# Patient Record
Sex: MALE | Race: WHITE | Employment: FULL TIME | URBAN - METROPOLITAN AREA
[De-identification: names, ages, dates, MRNs, and addresses within clinical notes are randomized per-mention and may not be internally consistent; named-entity substitution may affect disease eponyms.]

---

## 2017-03-21 ENCOUNTER — APPOINTMENT (OUTPATIENT)
Dept: GENERAL RADIOLOGY | Age: 42
DRG: 143 | End: 2017-03-21
Attending: EMERGENCY MEDICINE
Payer: MEDICAID

## 2017-03-21 ENCOUNTER — APPOINTMENT (OUTPATIENT)
Dept: GENERAL RADIOLOGY | Age: 42
DRG: 143 | End: 2017-03-21
Attending: INTERNAL MEDICINE
Payer: MEDICAID

## 2017-03-21 ENCOUNTER — HOSPITAL ENCOUNTER (INPATIENT)
Age: 42
LOS: 1 days | Discharge: HOME | DRG: 143 | End: 2017-03-25
Attending: EMERGENCY MEDICINE | Admitting: INTERNAL MEDICINE
Payer: MEDICAID

## 2017-03-21 DIAGNOSIS — J93.9 PNEUMOTHORAX, RIGHT: ICD-10-CM

## 2017-03-21 DIAGNOSIS — J93.11 PRIMARY SPONTANEOUS PNEUMOTHORAX: Primary | ICD-10-CM

## 2017-03-21 LAB
ALBUMIN SERPL BCP-MCNC: 4.2 G/DL (ref 3.5–5)
ALBUMIN/GLOB SERPL: 1.2 {RATIO} (ref 1.2–3.5)
ALP SERPL-CCNC: 60 U/L (ref 50–136)
ALT SERPL-CCNC: 26 U/L (ref 12–65)
ANION GAP BLD CALC-SCNC: 6 MMOL/L (ref 7–16)
AST SERPL W P-5'-P-CCNC: 16 U/L (ref 15–37)
BASOPHILS # BLD AUTO: 0 K/UL (ref 0–0.2)
BASOPHILS # BLD: 0 % (ref 0–2)
BILIRUB SERPL-MCNC: 0.2 MG/DL (ref 0.2–1.1)
BUN SERPL-MCNC: 13 MG/DL (ref 6–23)
CALCIUM SERPL-MCNC: 8.7 MG/DL (ref 8.3–10.4)
CHLORIDE SERPL-SCNC: 109 MMOL/L (ref 98–107)
CO2 SERPL-SCNC: 31 MMOL/L (ref 21–32)
CREAT SERPL-MCNC: 0.97 MG/DL (ref 0.8–1.5)
DIFFERENTIAL METHOD BLD: ABNORMAL
EOSINOPHIL # BLD: 0.2 K/UL (ref 0–0.8)
EOSINOPHIL NFR BLD: 2 % (ref 0.5–7.8)
ERYTHROCYTE [DISTWIDTH] IN BLOOD BY AUTOMATED COUNT: 12.8 % (ref 11.9–14.6)
GLOBULIN SER CALC-MCNC: 3.4 G/DL (ref 2.3–3.5)
GLUCOSE SERPL-MCNC: 96 MG/DL (ref 65–100)
HCT VFR BLD AUTO: 40.4 % (ref 41.1–50.3)
HGB BLD-MCNC: 13.7 G/DL (ref 13.6–17.2)
IMM GRANULOCYTES # BLD: 0 K/UL (ref 0–0.5)
IMM GRANULOCYTES NFR BLD AUTO: 0.1 % (ref 0–5)
LYMPHOCYTES # BLD AUTO: 45 % (ref 13–44)
LYMPHOCYTES # BLD: 3 K/UL (ref 0.5–4.6)
MCH RBC QN AUTO: 30 PG (ref 26.1–32.9)
MCHC RBC AUTO-ENTMCNC: 33.9 G/DL (ref 31.4–35)
MCV RBC AUTO: 88.4 FL (ref 79.6–97.8)
MONOCYTES # BLD: 0.7 K/UL (ref 0.1–1.3)
MONOCYTES NFR BLD AUTO: 10 % (ref 4–12)
NEUTS SEG # BLD: 3 K/UL (ref 1.7–8.2)
NEUTS SEG NFR BLD AUTO: 43 % (ref 43–78)
PLATELET # BLD AUTO: 196 K/UL (ref 150–450)
PMV BLD AUTO: 9.7 FL (ref 10.8–14.1)
POTASSIUM SERPL-SCNC: 3.9 MMOL/L (ref 3.5–5.1)
PROT SERPL-MCNC: 7.6 G/DL (ref 6.3–8.2)
RBC # BLD AUTO: 4.57 M/UL (ref 4.23–5.67)
SODIUM SERPL-SCNC: 146 MMOL/L (ref 136–145)
WBC # BLD AUTO: 6.9 K/UL (ref 4.3–11.1)

## 2017-03-21 PROCEDURE — 85025 COMPLETE CBC W/AUTO DIFF WBC: CPT | Performed by: EMERGENCY MEDICINE

## 2017-03-21 PROCEDURE — 71010 XR CHEST PORT: CPT

## 2017-03-21 PROCEDURE — 99284 EMERGENCY DEPT VISIT MOD MDM: CPT | Performed by: EMERGENCY MEDICINE

## 2017-03-21 PROCEDURE — 0W9930Z DRAINAGE OF RIGHT PLEURAL CAVITY WITH DRAINAGE DEVICE, PERCUTANEOUS APPROACH: ICD-10-PCS | Performed by: INTERNAL MEDICINE

## 2017-03-21 PROCEDURE — 74011250637 HC RX REV CODE- 250/637: Performed by: INTERNAL MEDICINE

## 2017-03-21 PROCEDURE — 74011250636 HC RX REV CODE- 250/636: Performed by: INTERNAL MEDICINE

## 2017-03-21 PROCEDURE — 99218 HC RM OBSERVATION: CPT

## 2017-03-21 PROCEDURE — 96372 THER/PROPH/DIAG INJ SC/IM: CPT | Performed by: EMERGENCY MEDICINE

## 2017-03-21 PROCEDURE — 71010 XR CHEST SNGL V: CPT

## 2017-03-21 PROCEDURE — 99222 1ST HOSP IP/OBS MODERATE 55: CPT | Performed by: INTERNAL MEDICINE

## 2017-03-21 PROCEDURE — 76040000019: Performed by: INTERNAL MEDICINE

## 2017-03-21 PROCEDURE — 80053 COMPREHEN METABOLIC PANEL: CPT | Performed by: EMERGENCY MEDICINE

## 2017-03-21 RX ORDER — HYDROCODONE BITARTRATE AND ACETAMINOPHEN 7.5; 325 MG/1; MG/1
1 TABLET ORAL
Qty: 10 TAB | Refills: 0 | Status: SHIPPED | OUTPATIENT
Start: 2017-03-21 | End: 2017-03-25

## 2017-03-21 RX ORDER — IBUPROFEN 400 MG/1
400 TABLET ORAL
Status: COMPLETED | OUTPATIENT
Start: 2017-03-21 | End: 2017-03-21

## 2017-03-21 RX ORDER — HYDROCODONE BITARTRATE AND ACETAMINOPHEN 7.5; 325 MG/1; MG/1
1 TABLET ORAL
Status: DISCONTINUED | OUTPATIENT
Start: 2017-03-21 | End: 2017-03-25 | Stop reason: HOSPADM

## 2017-03-21 RX ORDER — SODIUM CHLORIDE 0.9 % (FLUSH) 0.9 %
5-10 SYRINGE (ML) INJECTION AS NEEDED
Status: DISCONTINUED | OUTPATIENT
Start: 2017-03-21 | End: 2017-03-25 | Stop reason: HOSPADM

## 2017-03-21 RX ORDER — IBUPROFEN 400 MG/1
400 TABLET ORAL
Status: DISCONTINUED | OUTPATIENT
Start: 2017-03-21 | End: 2017-03-25 | Stop reason: HOSPADM

## 2017-03-21 RX ORDER — SODIUM CHLORIDE 0.9 % (FLUSH) 0.9 %
5-10 SYRINGE (ML) INJECTION EVERY 8 HOURS
Status: DISCONTINUED | OUTPATIENT
Start: 2017-03-21 | End: 2017-03-25 | Stop reason: HOSPADM

## 2017-03-21 RX ORDER — ENOXAPARIN SODIUM 100 MG/ML
40 INJECTION SUBCUTANEOUS EVERY 24 HOURS
Status: DISCONTINUED | OUTPATIENT
Start: 2017-03-21 | End: 2017-03-25 | Stop reason: HOSPADM

## 2017-03-21 RX ADMIN — IBUPROFEN 400 MG: 400 TABLET, FILM COATED ORAL at 17:52

## 2017-03-21 RX ADMIN — ENOXAPARIN SODIUM 40 MG: 40 INJECTION SUBCUTANEOUS at 09:43

## 2017-03-21 RX ADMIN — Medication 10 ML: at 21:44

## 2017-03-21 RX ADMIN — Medication 10 ML: at 17:55

## 2017-03-21 RX ADMIN — IBUPROFEN 400 MG: 400 TABLET, FILM COATED ORAL at 23:33

## 2017-03-21 RX ADMIN — HYDROCODONE BITARTRATE AND ACETAMINOPHEN 1 TABLET: 7.5; 325 TABLET ORAL at 19:51

## 2017-03-21 NOTE — DISCHARGE SUMMARY
Discharge Note    Negar Ureña  Admission date:  3/21/2017  Discharge date:  3/25/2017  Admitting Diagnosis:  Pneumothorax, unspecified type [J93.9]    Discharge Diagnoses:   Hospital Problems  Date Reviewed: 3/25/2017          Codes Class Noted POA    * (Principal)Pneumothorax, right ICD-10-CM: J93.9  ICD-9-CM: 512.89  3/21/2017 Yes              Consultants:   None     Studies/Procedures:  CXR  Uresil placement    Condition on Discharge:   Stable    Disposition:    Home     Presenting Illness: Patient is a 37year old  male who presents with PTX. He is otherwise healthy, but noticed some mild chest discomfort with respirations on Sunday. He states that this persisted and slightly worsened. Today, he went to . He was found to have a small R sided PTX and he was sent here. He denies SOB and pain. He feels movement with ambulation or taking a deep breath. He is a former smoker and stopped smoking 4 years ago. He denies any similar symptoms in the past and denies any obvious precipitating event. Hospital course: Patient was admitted for observation. He was placed on oxygen for comfort. Chest xray was rechecked and showed persisting small right apical pneumothorax and patient with ongoing complaints of chest pain. Ure-Bianca was placed for treatment of pneumothorax. Uresil was capped and removed 3/24 and follow up CXR on 3/25 with no evidence of pneumothorax. Patient has improved, remains on RA and is now ready for discharge. Patient will follow up as a transitional care appointment in our office 2 weeks with CXR    Physical Exam:   Constitution:  the patient is well developed and in no acute distress  EENMT:  Sclera clear, pupils equal, oral mucosa moist  Respiratory: clear to auscultation bilaterally, RA  Cardiovascular:  RRR without M,G,R  Gastrointestinal: soft and non-tender; with positive bowel sounds. Musculoskeletal: warm without cyanosis. There is no lower leg edema.   Skin:  no jaundice or rashes, no wounds   Neurologic: no gross neuro deficits     Psychiatric:  alert and oriented x 3      LAB  No results for input(s): WBC, HGB, HCT, PLT, INR, HGBEXT, HCTEXT, PLTEXT, HGBEXT, HCTEXT, PLTEXT in the last 72 hours. No lab exists for component: INREXT, INREXT  No results for input(s): NA, K, CL, CO2, BUN, CREA, MG, PHOS, ALB, TBIL, GPT, BNPP in the last 72 hours. No lab exists for component: TROIP,  SGOT  Discharge Medications:   Current Discharge Medication List      START taking these medications    Details   HYDROcodone-acetaminophen (NORCO) 7.5-325 mg per tablet Take 1 Tab by mouth every six (6) hours as needed. Max Daily Amount: 4 Tabs. Qty: 10 Tab, Refills: 0              Followup/Outpt Studies:    --Follow up with [facility] as a new patient appointment in 2-4 weeks with CXR  --Ravalli as needed for pain. Total discharge greater than 30 minutes in duration. More than 50% of the time documented was spent in face-to-face contact with the patient and in the care of the patient on the floor/unit where the patient is located. Meet Whatley NP     Lungs:  clear  Heart:  RRR with no Murmur/Rubs/Gallops    Additional Comments:  Ready for discharge. Counseled to come to hospital if dyspnea +/- CP develops. I have spoken with and examined the patient. I agree with the above assessment and plan as documented.     Lurdes Crews MD

## 2017-03-21 NOTE — PROGRESS NOTES
TRANSFER - OUT REPORT:    Verbal report given to Fina Grimes (RN) on Benito Curtis  being returned back to [facility] for routine progression of care       Report consisted of patients Situation, Background, Assessment and   Recommendations(SBAR). Information from the following report(s) Procedure Summary was reviewed with the receiving nurse. Opportunity for questions and clarification was provided.

## 2017-03-21 NOTE — PROGRESS NOTES
Talked with Marciano Granger in the [address] [place], [place] [address]e lab who discussed with me pt going for a small heimlich valve to be put it (uresil) for him to go home with.

## 2017-03-21 NOTE — ED PROVIDER NOTES
HPI Comments: Patient states that he started having some mild discomfort with respirations on Sunday. He states that this persisted and got slightly worse. Oris Tin He then went to  and the next day. The chest x-ray which showed a pneumothorax on the right side. He was then transferred here. He states that his symptoms are progressing and he noticed he was hurting a little bit worse while he was eating dinner tonight. He denies similar symptoms in the past and denies any obvious precipitating event. He is a former smoker and stopped smoking 4 years ago. Elements of this note were made using speech recognition software. As such, errors of speech recognition may occur. Patient is a 37 y.o. male presenting with other event. The history is provided by the patient. Other          History reviewed. No pertinent past medical history. Past Surgical History:   Procedure Laterality Date    HX OTHER SURGICAL      pyloric stenosis         History reviewed. No pertinent family history. Social History     Social History    Marital status:      Spouse name: N/A    Number of children: N/A    Years of education: N/A     Occupational History    Not on file. Social History Main Topics    Smoking status: Former Smoker     Years: 24.00    Smokeless tobacco: Former User     Quit date: 3/21/2013    Alcohol use No    Drug use: No    Sexual activity: Not on file     Other Topics Concern    Not on file     Social History Narrative    No narrative on file         ALLERGIES: Review of patient's allergies indicates no known allergies. Review of Systems   Constitutional: Negative for chills and fever. Gastrointestinal: Negative for nausea and vomiting. All other systems reviewed and are negative.       Vitals:    03/21/17 0019   BP: 132/71   Pulse: 76   Resp: 20   Temp: 98 °F (36.7 °C)   SpO2: 99%   Weight: 61.2 kg (135 lb)   Height: 5' 9\" (1.753 m)            Physical Exam   Constitutional: He is oriented to person, place, and time. He appears well-developed and well-nourished. HENT:   Head: Normocephalic and atraumatic. Eyes: Conjunctivae are normal. Pupils are equal, round, and reactive to light. Neck: Normal range of motion. Neck supple. Cardiovascular: Normal rate and regular rhythm. Pulmonary/Chest: Effort normal and breath sounds normal.   Abdominal: Soft. Bowel sounds are normal.   Musculoskeletal: He exhibits no edema or tenderness. Neurological: He is alert and oriented to person, place, and time. Skin: Skin is warm and dry. Psychiatric: He has a normal mood and affect. His behavior is normal.   Nursing note and vitals reviewed.        MDM  Number of Diagnoses or Management Options  Primary spontaneous pneumothorax:   Diagnosis management comments: Differential diagnoses: Pneumothorax, rib fracture, pleurisy  1:29 AM and reviewed x-rays sent from [id number], shows a right-sided pneumothorax  2:18 AM patient is resting comfortably and in no distress on oxygen by nasal cannula  3:41 AM discussed results with the intensivist, to see patient for admission       Amount and/or Complexity of Data Reviewed  Clinical lab tests: ordered and reviewed  Tests in the radiology section of CPT®: reviewed and ordered  Discuss the patient with other providers: yes  Independent visualization of images, tracings, or specimens: yes    Risk of Complications, Morbidity, and/or Mortality  Presenting problems: moderate  Diagnostic procedures: moderate  Management options: moderate    Patient Progress  Patient progress: improved    ED Course       Procedures

## 2017-03-21 NOTE — IP AVS SNAPSHOT
Summary of Care Report The Summary of Care report has been created to help improve care coordination. Users with access to [employer] or [address] (Web-based application) may access additional patient information including the Discharge Summary. If you are not currently a [facility] user and need more information, please call the number listed below in the [place] [address] section and ask to be connected with Medical Records. Facility Information Name Address Phone [address] [address] [address] [phone] Patient Information Patient Name Sex  Christa Gowers ([id number]) Male 1975 Discharge Information Admitting Provider Service Area Unit  
 Maxx Ferguson MD / [address] 2 Surgical / [phone] Discharge Provider Discharge Date/Time Discharge Disposition Destination (none) 3/25/2017 Morning (Pending) A[facility] (none) Patient Language Language ENGLISH [13] Hospital Problems as of 3/25/2017  Reviewed: 3/25/2017  7:35 AM by Zulay العراقي NP Class Noted - Resolved Last Modified POA Active Problems * (Principal)Pneumothorax, right  3/21/2017 - Present 3/25/2017 by Zulay العراقي NP Yes Entered by Maxx Ferguson MD  
  
Non-Hospital Problems as of 3/25/2017  Reviewed: 3/25/2017  7:35 AM by Zulay العراقي NP None You are allergic to the following No active allergies Current Discharge Medication List  
  
START taking these medications Dose & Instructions Dispensing Information Comments HYDROcodone-acetaminophen 7.5-325 mg per tablet Commonly known as:  Ashwin Lei Dose:  1 Tab Take 1 Tab by mouth every six (6) hours as needed. Max Daily Amount: 4 Tabs. Quantity:  10 Tab Refills:  0 Surgery Information ID Date/Time Status Primary Surgeon All Procedures Location [id number] 3/21/2017  4:00 PM Posted Ramón Coffey MD CHEST TUBES INSERTION [facility] Follow-up Information Follow up With Details Comments Contact Info None   None ([phone]) Patient stated that they have no PCP On 3/22/2017 7 am to 7 pm for CXR at [address] [place] [address] Pulmonary and Critical Care On 3/24/2017 0750 am  [place] [facility] 
[phone] Discharge Instructions Chest Tube: What to Expect at [facility] Your Recovery A chest tube is placed through the chest wall between two ribs. You had a chest tube put in to help your collapsed lung expand. The tube was removed before you came home. You may have some pain in your chest from the cut (incision) where the tube was put in. For most people, the pain goes away after about 2 weeks. You will have a bandage taped over the wound. Your doctor will remove the bandage and examine the wound in about 2 days. It will take about 3 to 4 weeks for your incision to heal completely. It may leave a small scar that will fade with time. This care sheet gives you a general idea about how long it will take for you to recover. But each person recovers at a different pace. Follow the steps below to feel better as quickly as possible. How can you care for yourself at home? Activity · Rest when you feel tired. Getting enough sleep will help you recover. · Try to walk each day. Start by walking a little more than you did the day before. Bit by bit, increase the amount you walk. Walking boosts blood flow and helps prevent pneumonia and constipation. · Avoid strenuous activities, such as bicycle riding, jogging, weight lifting, or aerobic exercise, until your doctor says it is okay.  
· How soon you can return to work or your normal routine depends on what health problems you have. Talk with your doctor about how long it will take you to recover. · You may shower after your bandage is removed. Pat the cut (incision) dry. Do not take a bath for 2 weeks after your chest tube is out, or until your doctor tells you it is okay. · Practice deep breathing exercises as directed by your doctor. Coughing exercises also can help drain fluid out of your chest. 
Diet · You can eat your normal diet. If your stomach is upset, try bland, low-fat foods like plain rice, broiled chicken, toast, and yogurt. · Drink plenty of fluids (unless your doctor tells you not to). Medicines · Your doctor will tell you if and when you can restart your medicines. He or she will also give you instructions about taking any new medicines. · If you take blood thinners, such as warfarin (Coumadin), clopidogrel (Plavix), or aspirin, be sure to talk to your doctor. He or she will tell you if and when to start taking those medicines again. Make sure that you understand exactly what your doctor wants you to do. · Be safe with medicines. Take pain medicines exactly as directed. ¨ If the doctor gave you a prescription medicine for pain, take it as prescribed. ¨ If you are not taking a prescription pain medicine, ask your doctor if you can take an over-the-counter medicine. · Take your antibiotics as directed. Do not stop taking them just because you feel better. You need to take the full course of antibiotics. Incision care · Keep the incision dry as it heals. You will have a bandage over it to help the incision heal and protect it. Your doctor will tell you how to take care of this. Other instructions · Do not smoke. Smoking makes lung problems worse. If you need help quitting, talk to your doctor about stop-smoking programs and medicines. These can increase your chances of quitting for good. Follow-up care is a key part of your treatment and safety.  Be sure to make and go to all appointments, and call your doctor if you are having problems. It's also a good idea to know your test results and keep a list of the medicines you take. When should you call for help? Call 911 anytime you think you may need emergency care. For example, call if: 
· You passed out (lost consciousness). · You have severe trouble breathing. · You have sudden chest pain and shortness of breath, or you cough up blood. Call your doctor now or seek immediate medical care if: 
· You continue to have trouble breathing. · Your shortness of breath is getting worse. · Bright red blood soaks through the bandage over your incision. · You have a fever. Watch closely for any changes in your health, and be sure to contact your doctor if: 
· You do not get better as expected. Where can you learn more? Go to http://mirnaMercantecmelissa.info/. Enter N768 in the search box to learn more about \"Chest Tube: What to Expect at Home. \" Current as of: May 23, 2016 Content Version: 11.1 © 9893-0733 [employer]. Care instructions adapted under license by [employer] (which disclaims liability or warranty for this information). If you have questions about a medical condition or this instruction, always ask your healthcare professional. [address] any warranty or liability for your use of this information. DISCHARGE SUMMARY from Nurse The following personal items are in your possession at time of discharge: 
 
Dental Appliances: None Visual Aid: None Home Medications: None Jewelry: With patient Clothing: With patient, Undergarments, Socks, Shirt, Pants, Footwear, [employer] Other Valuables: At bedside, Cell Phone PATIENT INSTRUCTIONS: 
 
After general anesthesia or intravenous sedation, for 24 hours or while taking prescription Narcotics: · Limit your activities · Do not drive and operate hazardous machinery · Do not make important personal or business decisions · Do  not drink alcoholic beverages · If you have not urinated within 8 hours after discharge, please contact your surgeon on call. Report the following to your surgeon: 
· Excessive pain, swelling, redness or odor of or around the surgical area · Temperature over 100.5 · Nausea and vomiting lasting longer than 4 hours or if unable to take medications · Any signs of decreased circulation or nerve impairment to extremity: change in color, persistent  numbness, tingling, coldness or increase pain · Any questions What to do at Home: 
Recommended activity: Activity as tolerated, per MD instructions If you experience any of the following symptoms fever > 100.5, nausea, vomiting, pain, chest pain, shortness of breath please follow up with MD. 
 
 
*  Please give a list of your current medications to your Primary Care Provider. *  Please update this list whenever your medications are discontinued, doses are 
    changed, or new medications (including over-the-counter products) are added. *  Please carry medication information at all times in case of emergency situations. These are general instructions for a healthy lifestyle: No smoking/ No tobacco products/ Avoid exposure to second hand smoke Surgeon General's Warning:  Quitting smoking now greatly reduces serious risk to your health. Obesity, smoking, and sedentary lifestyle greatly increases your risk for illness A healthy diet, regular physical exercise & weight monitoring are important for maintaining a healthy lifestyle You may be retaining fluid if you have a history of heart failure or if you experience any of the following symptoms:  Weight gain of 3 pounds or more overnight or 5 pounds in a week, increased swelling in our hands or feet or shortness of breath while lying flat in bed.   Please call your doctor as soon as you notice any of these symptoms; do not wait until your next office visit. Recognize signs and symptoms of STROKE: 
 
F-face looks uneven A-arms unable to move or move unevenly S-speech slurred or non-existent T-time-call 911 as soon as signs and symptoms begin-DO NOT go Back to bed or wait to see if you get better-TIME IS BRAIN. Warning Signs of HEART ATTACK Call 911 if you have these symptoms: 
? Chest discomfort. Most heart attacks involve discomfort in the center of the chest that lasts more than a few minutes, or that goes away and comes back. It can feel like uncomfortable pressure, squeezing, fullness, or pain. ? Discomfort in other areas of the upper body. Symptoms can include pain or discomfort in one or both arms, the back, neck, jaw, or stomach. ? Shortness of breath with or without chest discomfort. ? Other signs may include breaking out in a cold sweat, nausea, or lightheadedness. Don't wait more than five minutes to call [address]! Fast action can save your life. Calling 911 is almost always the fastest way to get lifesaving treatment. Emergency Medical Services staff can begin treatment when they arrive  up to an hour sooner than if someone gets to the hospital by car. The discharge information has been reviewed with the patient. The patient verbalized understanding. Discharge medications reviewed with the patient and appropriate educational materials and side effects teaching were provided. Collapsed Lung: Care Instructions Your Care Instructions A collapsed lung (pneumothorax) is a buildup of air in the space between the lung and the chest wall. As more air builds up in this space, the pressure against the lung makes the lung collapse. This causes shortness of breath and chest pain because your lung cannot fully expand.  
A collapsed lung is usually caused by an injury to the chest, but it may also occur suddenly without an injury because of a lung illness, such as emphysema or lung fibrosis. Your lung may collapse after lung surgery or another medical procedure. Sometimes it happens for no known reason in an otherwise healthy person (spontaneous pneumothorax). Treatment depends on the cause of the collapse. It may heal with rest, although your doctor will want to keep track of your progress. It can take several days for the lung to expand again. Your doctor may have drained the air with a needle or tube inserted into the space between your chest and the collapsed lung. If you have a chest tube, be sure to follow your doctor's instructions about how to care for the tube. You may need further treatment if you are not getting better. Surgery is sometimes needed to keep the lung inflated. The doctor will want to keep track of your progress, so you will need a follow-up exam within a few days. The doctor has checked you carefully, but problems can develop later. If you notice any problems or new symptoms, get medical treatment right away. Follow-up care is a key part of your treatment and safety. Be sure to make and go to all appointments, and call your doctor if you are having problems. It's also a good idea to know your test results and keep a list of the medicines you take. How can you care for yourself at home? · Get plenty of rest and sleep. You may feel weak and tired for a while, but your energy level will improve with time. · Hold a pillow against your chest when you cough or take deep breaths. This will support your chest and decrease your pain. · Take pain medicines exactly as directed. ¨ If the doctor gave you a prescription medicine for pain, take it as prescribed. ¨ If you are not taking a prescription pain medicine, ask your doctor if you can take an over-the-counter medicine. · If your doctor prescribed antibiotics, take them as directed.  Do not stop taking them just because you feel better. You need to take the full course of antibiotics. · If you have a bandage over your chest tube, or the place where the chest tube was inserted, keep it clean and dry. Follow your doctor's instructions on bandage care. · If you go home with a tube in place, follow the doctor's directions. Do not adjust the tube in any way. This could break the seal or cause other problems. Keep the tube dry. · Avoid any movements that require your muscles, especially your chest muscles, to strain. Such movements include laughing hard, bearing down to have a bowel movement, and heavy lifting. Try not to cough. · Do not fly in an airplane until your doctor tells you it is okay. Avoid any situations where there is increased air pressure. · Do not smoke or allow others to smoke around you. If you need help quitting, talk to your doctor about stop-smoking programs and medicines. These can increase your chances of quitting for good. When should you call for help? Call 911 anytime you think you may need emergency care. For example, call if: 
· You have severe trouble breathing. · You passed out (lost consciousness). Call your doctor now or seek immediate medical care if: 
· You have new or worse trouble breathing. · You have new pain or your pain gets worse. · You have a fever. · You cough up blood. · Your chest tube starts to come out or falls out. · You are bleeding through the bandage where the tube was put in. Watch closely for changes in your health, and be sure to contact your doctor if: · The skin around the place where the chest tube was put in is red or irritated. · You do not get better as expected. Where can you learn more? Go to http://mirna-melissa.info/. Enter F799 in the search box to learn more about \"Collapsed Lung: Care Instructions. \" Current as of: May 23, 2016 Content Version: 11.1 © 5723-8992 [employer]. Care instructions adapted under license by [employer] (which disclaims liability or warranty for this information). If you have questions about a medical condition or this instruction, always ask your healthcare professional. [place] [address] any warranty or liability for your use of this information. Chart Review Routing History No Routing History on File

## 2017-03-21 NOTE — ED NOTES
TRANSFER - OUT REPORT:    Verbal report given to [facility] RN(name) on Quentin Devries  being transferred to [facility](unit) for routine progression of care       Report consisted of patients Situation, Background, Assessment and   Recommendations(SBAR). Information from the following report(s) SBAR and ED Summary was reviewed with the receiving nurse. Lines:   Peripheral IV 03/21/17 Left Antecubital (Active)        Opportunity for questions and clarification was provided.       Patient transported with:   [employer]

## 2017-03-21 NOTE — PROCEDURES
PROCEDURE:     13F Ure-Bianca device placement for pneumothorax. INDICATION:    RIGHT PNEUMOTHORAX    DESCRIPTION:     After obtaining informed consent from the patient, the right/left chest was prepped with chlorhexidine in the usual fashion. The third intercostal space was then localized and 1% lidocaine was injected over the underlying rib (5 cc total) anesthetizing the subcutaneous tissue skin rib and intercostal muscles. The depth of the pleural space was then noted by aspirating air into the syringe. Following this the provided scalpel within the kit was used to make a small 3-mm incision in the needle insertion area. After assuring adequate hemostasis the  13 F. Ure-Bianca device was introduced into the chest using the provided trocar in the kit in the usual fashion. The adhesive type protector was then removed and the hole device affixed to the patient's chest the valve within the device was seen moving up and down with respiration. A One-way valve provided in the kit was then attached and air aspirated with a 60 cc syringe removing a total off 450 cc of air. The suction port was then closed with the provided cap. There were no complications, the patient tolerated the procedure very well and was discharged home after a chest x-ray was performed in the radiology department. RECOMMENDATION:  FU/CXR and remove uresil once air leak resolved.     Rebekah Carney MD.

## 2017-03-21 NOTE — IP AVS SNAPSHOT
[facility] 
 
 
 [address] 
[phone] Patient: Daria Barnett MRN: [id number] :1975 You are allergic to the following No active allergies Recent Documentation Height Weight BMI Smoking Status 1.753 m 61.2 kg 19.94 kg/m2 Former Smoker Emergency Contacts Name Discharge Info Relation Home Work Mobile Amanda Petersen  Spouse [3] [phone] About your hospitalization You were admitted on:  2017 You last received care in the:  [facility] 2 SURGICAL You were discharged on:  2017 Unit phone number:  [phone] Why you were hospitalized Your primary diagnosis was:  Pneumothorax, Right Providers Seen During Your Hospitalizations Provider Role Specialty Primary office phone Cami Bernal MD Attending Provider Emergency Medicine [phone] Humaira Sanchez MD Attending Provider Pulmonary Disease [phone] Your Primary Care Physician (PCP) Primary Care Physician Office Phone Office Fax NONE ** None ** ** None ** Follow-up Information Follow up With Details Comments Contact Info None   None ([phone]) Patient stated that they have no PCP On 3/22/2017 7 am to 7 pm for CXR at [address] [place] [address] Pulmonary and Critical Care On 3/24/2017 0750 am  [address] [facility] [address] [facility] 
[phone] Current Discharge Medication List  
  
START taking these medications Dose & Instructions Dispensing Information Comments Morning Noon Evening Bedtime HYDROcodone-acetaminophen 7.5-325 mg per tablet Commonly known as:  Diana Salas Dose:  1 Tab Take 1 Tab by mouth every six (6) hours as needed. Max Daily Amount: 4 Tabs. Quantity:  10 Tab Refills:  0 Where to Get Your Medications Information on where to get these meds will be given to you by the nurse or doctor. ! Ask your nurse or doctor about these medications HYDROcodone-acetaminophen 7.5-325 mg per tablet Discharge Instructions Chest Tube: What to Expect at [facility] Your Recovery A chest tube is placed through the chest wall between two ribs. You had a chest tube put in to help your collapsed lung expand. The tube was removed before you came home. You may have some pain in your chest from the cut (incision) where the tube was put in. For most people, the pain goes away after about 2 weeks. You will have a bandage taped over the wound. Your doctor will remove the bandage and examine the wound in about 2 days. It will take about 3 to 4 weeks for your incision to heal completely. It may leave a small scar that will fade with time. This care sheet gives you a general idea about how long it will take for you to recover. But each person recovers at a different pace. Follow the steps below to feel better as quickly as possible. How can you care for yourself at home? Activity · Rest when you feel tired. Getting enough sleep will help you recover. · Try to walk each day. Start by walking a little more than you did the day before. Bit by bit, increase the amount you walk. Walking boosts blood flow and helps prevent pneumonia and constipation. · Avoid strenuous activities, such as bicycle riding, jogging, weight lifting, or aerobic exercise, until your doctor says it is okay. · How soon you can return to work or your normal routine depends on what health problems you have. Talk with your doctor about how long it will take you to recover. · You may shower after your bandage is removed. Pat the cut (incision) dry. Do not take a bath for 2 weeks after your chest tube is out, or until your doctor tells you it is okay. · Practice deep breathing exercises as directed by your doctor.  Coughing exercises also can help drain fluid out of your chest. 
Diet · You can eat your normal diet. If your stomach is upset, try bland, low-fat foods like plain rice, broiled chicken, toast, and yogurt. · Drink plenty of fluids (unless your doctor tells you not to). Medicines · Your doctor will tell you if and when you can restart your medicines. He or she will also give you instructions about taking any new medicines. · If you take blood thinners, such as warfarin (Coumadin), clopidogrel (Plavix), or aspirin, be sure to talk to your doctor. He or she will tell you if and when to start taking those medicines again. Make sure that you understand exactly what your doctor wants you to do. · Be safe with medicines. Take pain medicines exactly as directed. ¨ If the doctor gave you a prescription medicine for pain, take it as prescribed. ¨ If you are not taking a prescription pain medicine, ask your doctor if you can take an over-the-counter medicine. · Take your antibiotics as directed. Do not stop taking them just because you feel better. You need to take the full course of antibiotics. Incision care · Keep the incision dry as it heals. You will have a bandage over it to help the incision heal and protect it. Your doctor will tell you how to take care of this. Other instructions · Do not smoke. Smoking makes lung problems worse. If you need help quitting, talk to your doctor about stop-smoking programs and medicines. These can increase your chances of quitting for good. Follow-up care is a key part of your treatment and safety. Be sure to make and go to all appointments, and call your doctor if you are having problems. It's also a good idea to know your test results and keep a list of the medicines you take. When should you call for help? Call 911 anytime you think you may need emergency care. For example, call if: 
· You passed out (lost consciousness). · You have severe trouble breathing. · You have sudden chest pain and shortness of breath, or you cough up blood. Call your doctor now or seek immediate medical care if: 
· You continue to have trouble breathing. · Your shortness of breath is getting worse. · Bright red blood soaks through the bandage over your incision. · You have a fever. Watch closely for any changes in your health, and be sure to contact your doctor if: 
· You do not get better as expected. Where can you learn more? Go to http://mirna-melissa.info/. Enter K873 in the search box to learn more about \"Chest Tube: What to Expect at Home. \" Current as of: May 23, 2016 Content Version: 11.1 © 8472-0004 [employer]. Care instructions adapted under license by [employer] (which disclaims liability or warranty for this information). If you have questions about a medical condition or this instruction, always ask your healthcare professional. [place] [address] any warranty or liability for your use of this information. DISCHARGE SUMMARY from Nurse The following personal items are in your possession at time of discharge: 
 
Dental Appliances: None Visual Aid: None Home Medications: None Jewelry: With patient Clothing: With patient, Undergarments, Socks, Shirt, Pants, Footwear, [employer] Other Valuables: At bedside, Cell Phone PATIENT INSTRUCTIONS: 
 
After general anesthesia or intravenous sedation, for 24 hours or while taking prescription Narcotics: · Limit your activities · Do not drive and operate hazardous machinery · Do not make important personal or business decisions · Do  not drink alcoholic beverages · If you have not urinated within 8 hours after discharge, please contact your surgeon on call. Report the following to your surgeon: 
· Excessive pain, swelling, redness or odor of or around the surgical area · Temperature over 100.5 · Nausea and vomiting lasting longer than 4 hours or if unable to take medications · Any signs of decreased circulation or nerve impairment to extremity: change in color, persistent  numbness, tingling, coldness or increase pain · Any questions What to do at Home: 
Recommended activity: Activity as tolerated, per MD instructions If you experience any of the following symptoms fever > 100.5, nausea, vomiting, pain, chest pain, shortness of breath please follow up with MD. 
 
 
*  Please give a list of your current medications to your Primary Care Provider. *  Please update this list whenever your medications are discontinued, doses are 
    changed, or new medications (including over-the-counter products) are added. *  Please carry medication information at all times in case of emergency situations. These are general instructions for a healthy lifestyle: No smoking/ No tobacco products/ Avoid exposure to second hand smoke Surgeon General's Warning:  Quitting smoking now greatly reduces serious risk to your health. Obesity, smoking, and sedentary lifestyle greatly increases your risk for illness A healthy diet, regular physical exercise & weight monitoring are important for maintaining a healthy lifestyle You may be retaining fluid if you have a history of heart failure or if you experience any of the following symptoms:  Weight gain of 3 pounds or more overnight or 5 pounds in a week, increased swelling in our hands or feet or shortness of breath while lying flat in bed. Please call your doctor as soon as you notice any of these symptoms; do not wait until your next office visit. Recognize signs and symptoms of STROKE: 
 
F-face looks uneven A-arms unable to move or move unevenly S-speech slurred or non-existent T-time-call 911 as soon as signs and symptoms begin-DO NOT go Back to bed or wait to see if you get better-TIME IS BRAIN. Warning Signs of HEART ATTACK Call 911 if you have these symptoms: 
? Chest discomfort. Most heart attacks involve discomfort in the center of the chest that lasts more than a few minutes, or that goes away and comes back. It can feel like uncomfortable pressure, squeezing, fullness, or pain. ? Discomfort in other areas of the upper body. Symptoms can include pain or discomfort in one or both arms, the back, neck, jaw, or stomach. ? Shortness of breath with or without chest discomfort. ? Other signs may include breaking out in a cold sweat, nausea, or lightheadedness. Don't wait more than five minutes to call [address]! Fast action can save your life. Calling 911 is almost always the fastest way to get lifesaving treatment. Emergency Medical Services staff can begin treatment when they arrive  up to an hour sooner than if someone gets to the hospital by car. The discharge information has been reviewed with the patient. The patient verbalized understanding. Discharge medications reviewed with the patient and appropriate educational materials and side effects teaching were provided. Collapsed Lung: Care Instructions Your Care Instructions A collapsed lung (pneumothorax) is a buildup of air in the space between the lung and the chest wall. As more air builds up in this space, the pressure against the lung makes the lung collapse. This causes shortness of breath and chest pain because your lung cannot fully expand. A collapsed lung is usually caused by an injury to the chest, but it may also occur suddenly without an injury because of a lung illness, such as emphysema or lung fibrosis. Your lung may collapse after lung surgery or another medical procedure. Sometimes it happens for no known reason in an otherwise healthy person (spontaneous pneumothorax). Treatment depends on the cause of the collapse.  It may heal with rest, although your doctor will want to keep track of your progress. It can take several days for the lung to expand again. Your doctor may have drained the air with a needle or tube inserted into the space between your chest and the collapsed lung. If you have a chest tube, be sure to follow your doctor's instructions about how to care for the tube. You may need further treatment if you are not getting better. Surgery is sometimes needed to keep the lung inflated. The doctor will want to keep track of your progress, so you will need a follow-up exam within a few days. The doctor has checked you carefully, but problems can develop later. If you notice any problems or new symptoms, get medical treatment right away. Follow-up care is a key part of your treatment and safety. Be sure to make and go to all appointments, and call your doctor if you are having problems. It's also a good idea to know your test results and keep a list of the medicines you take. How can you care for yourself at home? · Get plenty of rest and sleep. You may feel weak and tired for a while, but your energy level will improve with time. · Hold a pillow against your chest when you cough or take deep breaths. This will support your chest and decrease your pain. · Take pain medicines exactly as directed. ¨ If the doctor gave you a prescription medicine for pain, take it as prescribed. ¨ If you are not taking a prescription pain medicine, ask your doctor if you can take an over-the-counter medicine. · If your doctor prescribed antibiotics, take them as directed. Do not stop taking them just because you feel better. You need to take the full course of antibiotics. · If you have a bandage over your chest tube, or the place where the chest tube was inserted, keep it clean and dry. Follow your doctor's instructions on bandage care. · If you go home with a tube in place, follow the doctor's directions.  Do not adjust the tube in any way. This could break the seal or cause other problems. Keep the tube dry. · Avoid any movements that require your muscles, especially your chest muscles, to strain. Such movements include laughing hard, bearing down to have a bowel movement, and heavy lifting. Try not to cough. · Do not fly in an airplane until your doctor tells you it is okay. Avoid any situations where there is increased air pressure. · Do not smoke or allow others to smoke around you. If you need help quitting, talk to your doctor about stop-smoking programs and medicines. These can increase your chances of quitting for good. When should you call for help? Call 911 anytime you think you may need emergency care. For example, call if: 
· You have severe trouble breathing. · You passed out (lost consciousness). Call your doctor now or seek immediate medical care if: 
· You have new or worse trouble breathing. · You have new pain or your pain gets worse. · You have a fever. · You cough up blood. · Your chest tube starts to come out or falls out. · You are bleeding through the bandage where the tube was put in. Watch closely for changes in your health, and be sure to contact your doctor if: · The skin around the place where the chest tube was put in is red or irritated. · You do not get better as expected. Where can you learn more? Go to http://mirna-melissa.info/. Enter M697 in the search box to learn more about \"Collapsed Lung: Care Instructions. \" Current as of: May 23, 2016 Content Version: 11.1 © 9121-2661 [employer]. Care instructions adapted under license by [employer] (which disclaims liability or warranty for this information). If you have questions about a medical condition or this instruction, always ask your healthcare professional. [address] any warranty or liability for your use of this information. Discharge Orders None Soneter Announcement We are excited to announce that we are making your provider's discharge notes available to you in Soneter. You will see these notes when they are completed and signed by the physician that discharged you from your recent hospital stay. If you have any questions or concerns about any information you see in Soneter, please call the Health Information Department where you were seen or reach out to your Primary Care Provider for more information about your plan of care. Introducing [facility]! Leonidas Diaz introduces Soneter patient portal. Now you can access parts of your medical record, email your doctor's office, and request medication refills online. 1. In your internet browser, go to [email or web address] 2. Click on the First Time User? Click Here link in the Sign In box. You will see the New Member Sign Up page. 3. Enter your Soneter Access Code exactly as it appears below. You will not need to use this code after youve completed the sign-up process. If you do not sign up before the expiration date, you must request a new code. · Soneter Access Code: [email or web address] Expires: 6/19/2017 12:10 AM 
 
4. Enter the last four digits of your Social Security Number (xxxx) and Date of Birth (mm/dd/yyyy) as indicated and click Submit. You will be taken to the next sign-up page. 5. Create a Soneter ID. This will be your Soneter login ID and cannot be changed, so think of one that is secure and easy to remember. 6. Create a Soneter password. You can change your password at any time. 7. Enter your Password Reset Question and Answer. This can be used at a later time if you forget your password. 8. Enter your e-mail address. You will receive e-mail notification when new information is available in [address] 9. Click Sign Up. You can now view and download portions of your medical record. 10. Click the Download Summary menu link to download a portable copy of your medical information. If you have questions, please visit the Frequently Asked Questions section of the [employer] website. Remember, MyChart is NOT to be used for urgent needs. For medical emergencies, dial 911. Now available from your iPhone and Android! General Information Please provide this summary of care documentation to your next provider. Patient Signature:  ____________________________________________________________ Date:  ____________________________________________________________  
  
[facility] Provider Signature:  ____________________________________________________________ Date:  ____________________________________________________________

## 2017-03-21 NOTE — PROGRESS NOTES
TRANSFER - IN REPORT:    Verbal report received from Marcia Merrill on Quentin Devries  being received from ER(unit) for routine progression of care      Report consisted of patients Situation, Background, Assessment and   Recommendations(SBAR). Information from the following report(s) SBAR was reviewed with the receiving nurse. Opportunity for questions and clarification was provided. Assessment completed upon patients arrival to unit and care assumed.

## 2017-03-21 NOTE — PROGRESS NOTES
TRANSFER - IN REPORT:    Verbal report received from Dona Ana on Radha Thurston  being received from ER for routine progression of care      Report consisted of patients Situation, Background, Assessment and   Recommendations(SBAR). Information from the following report(s) SBAR was reviewed with the receiving nurse. Opportunity for questions and clarification was provided. Assessment completed upon patients arrival to unit and care assumed.

## 2017-03-21 NOTE — PROGRESS NOTES
Primary Nurse Daria Watt RN and Rajan Herr RN performed a dual skin assessment on this patient No impairment noted  Elvin score is 21

## 2017-03-21 NOTE — PROGRESS NOTES
HISTORY AND PHYSICAL      Moon Medina    3/21/2017    Date of Admission:  3/21/2017    The patient's chart is reviewed and the patient is discussed with the staff. Subjective:     Patient is a 37 y.o.  male presents with PTX. HE is otherwise a healthy but yesterday he noticed  Some discomfort and   Something moving on R side of his chest while breathing. Today he realized it was till there and he knew it was not normal therefore he went to . HE was found to have small R sided PTX and he was sent here. HE really denies SOB even he denies pain ,he just feels movement especially when moving around   or taking deep breath . HE used to smoke and never had this happened before. Review of Systems  A comprehensive review of systems was negative except for that written in the HPI. Patient Active Problem List   Diagnosis Code    Pneumothorax, right J93.9       None       History reviewed. No pertinent past medical history. Past Surgical History:   Procedure Laterality Date    HX OTHER SURGICAL      pyloric stenosis     Social History     Social History    Marital status:      Spouse name: N/A    Number of children: N/A    Years of education: N/A     Occupational History    Not on file. Social History Main Topics    Smoking status: Former Smoker     Years: 24.00    Smokeless tobacco: Former User     Quit date: 3/21/2013    Alcohol use No    Drug use: No    Sexual activity: Not on file     Other Topics Concern    Not on file     Social History Narrative    No narrative on file     History reviewed. No pertinent family history. No Known Allergies    Current Facility-Administered Medications   Medication Dose Route Frequency    sodium chloride (NS) flush 5-10 mL  5-10 mL IntraVENous Q8H    sodium chloride (NS) flush 5-10 mL  5-10 mL IntraVENous PRN     No current outpatient prescriptions on file.            Objective:     Vitals:    03/21/17 0240 03/21/17 0300 03/21/17 0320 03/21/17 0340   BP: 103/57 105/62 96/57 93/52   Pulse: (!) 57 67 (!) 57 (!) 58   Resp:       Temp:       SpO2: 99% 100% 99% 98%   Weight:       Height:           PHYSICAL EXAM     Constitutional:  the patient is well developed and in no acute distress  EENMT:  Sclera clear, pupils equal, oral mucosa moist  Respiratory: clear  Cardiovascular:  RRR without M,G,R  Gastrointestinal: soft and non-tender; with positive bowel sounds. Musculoskeletal: warm without cyanosis. There is no lower leg edema. Skin:  no jaundice or rashes, no wounds   Neurologic: no gross neuro deficits     Psychiatric:  alert and oriented x 3    CXR:reviewed from , small PTX on R       Recent Labs      03/21/17   0139   WBC  6.9   HGB  13.7   HCT  40.4*   PLT  196     Recent Labs      03/21/17   0139   NA  146*   K  3.9   CL  109*   GLU  96   CO2  31   BUN  13   CREA  0.97   CA  8.7   ALB  4.2   TBILI  0.2   ALT  26   SGOT  16         Assessment:  (Medical Decision Making)     Hospital Problems  Never Reviewed          Codes Class Noted POA    * (Principal)Pneumothorax, right    Small on R ,will admit for observation ,no need for CT right now, ICD-10-CM: J93.9  ICD-9-CM: 512.89  3/21/2017 Unknown              Plan:  (Medical Decision Making)     He will be admitted for observation  -placed on 02 and recheck CXR later today, if stable he could probably go home and have follow up in our office     More than 50% of the time documented was spent in face-to-face contact with the patient and in the care of the patient on the floor/unit where the patient is located.     Ana Maria Alfredo MD

## 2017-03-22 ENCOUNTER — APPOINTMENT (OUTPATIENT)
Dept: GENERAL RADIOLOGY | Age: 42
DRG: 143 | End: 2017-03-22
Attending: INTERNAL MEDICINE
Payer: MEDICAID

## 2017-03-22 PROCEDURE — 99218 HC RM OBSERVATION: CPT

## 2017-03-22 PROCEDURE — 74011250636 HC RX REV CODE- 250/636: Performed by: INTERNAL MEDICINE

## 2017-03-22 PROCEDURE — 71020 XR CHEST PA LAT: CPT

## 2017-03-22 PROCEDURE — 74011250637 HC RX REV CODE- 250/637: Performed by: INTERNAL MEDICINE

## 2017-03-22 PROCEDURE — 99231 SBSQ HOSP IP/OBS SF/LOW 25: CPT | Performed by: INTERNAL MEDICINE

## 2017-03-22 RX ADMIN — Medication 10 ML: at 06:15

## 2017-03-22 RX ADMIN — HYDROCODONE BITARTRATE AND ACETAMINOPHEN 1 TABLET: 7.5; 325 TABLET ORAL at 19:53

## 2017-03-22 RX ADMIN — HYDROCODONE BITARTRATE AND ACETAMINOPHEN 1 TABLET: 7.5; 325 TABLET ORAL at 08:39

## 2017-03-22 RX ADMIN — Medication 10 ML: at 14:00

## 2017-03-22 RX ADMIN — Medication 10 ML: at 22:05

## 2017-03-22 RX ADMIN — ENOXAPARIN SODIUM 40 MG: 40 INJECTION SUBCUTANEOUS at 08:38

## 2017-03-22 RX ADMIN — HYDROCODONE BITARTRATE AND ACETAMINOPHEN 1 TABLET: 7.5; 325 TABLET ORAL at 14:12

## 2017-03-22 NOTE — PROGRESS NOTES
END OF SHIFT NOTE:    INTAKE/OUTPUT  03/21 0701 - 03/22 0700  In: -   Out: 850 [Urine:400]  Voiding: YES  Catheter: NO  Drain:              Flatus: Patient does have flatus present. Stool:  0 occurrences. Characteristics:       Emesis: 0 occurrences. Characteristics:        VITAL SIGNS  Patient Vitals for the past 12 hrs:   Temp Pulse Resp BP SpO2   03/22/17 1505 98.2 °F (36.8 °C) 69 17 94/51 98 %   03/22/17 1055 97.9 °F (36.6 °C) 76 16 107/50 97 %       Pain Assessment  Pain Intensity 1: 4 (03/22/17 1412)  Pain Location 1: Chest  Pain Intervention(s) 1: Medication (see MAR)  Patient Stated Pain Goal: 0    Ambulating  Yes    Shift report given to oncoming nurse at the bedside.     Ruthie Lee RN

## 2017-03-22 NOTE — PROGRESS NOTES
Nasir Blanca  Admission Date: 3/21/2017             Daily Progress Note: 3/22/2017    The patient's chart is reviewed and the patient is discussed with the staff. 36 yo WM with spontaneous PTX on R. Subjective:     Admitted to observation with Uresil placed by Dr. Yuliya Mcelroy yesterday afternoon. Post procedure CXR with resolution of PTX. Kept overnight. Pain is his complaint. No dyspnea.      Current Facility-Administered Medications   Medication Dose Route Frequency    sodium chloride (NS) flush 5-10 mL  5-10 mL IntraVENous Q8H    sodium chloride (NS) flush 5-10 mL  5-10 mL IntraVENous PRN    sodium chloride (NS) flush 5-10 mL  5-10 mL IntraVENous Q8H    sodium chloride (NS) flush 5-10 mL  5-10 mL IntraVENous PRN    enoxaparin (LOVENOX) injection 40 mg  40 mg SubCUTAneous Q24H    HYDROcodone-acetaminophen (NORCO) 7.5-325 mg per tablet 1 Tab  1 Tab Oral Q6H PRN    ibuprofen (MOTRIN) tablet 400 mg  400 mg Oral Q6H PRN       Review of Systems    Constitutional: negative for fever, chills, sweats  Cardiovascular: negative for chest pain, palpitations, syncope, edema  Gastrointestinal:  negative for dysphagia, reflux, vomiting, diarrhea, abdominal pain, or melena  Neurologic:  negative for focal weakness, numbness, headache    Objective:     Vitals:    03/21/17 1724 03/21/17 1951 03/21/17 2333 03/22/17 0300   BP: 114/69 102/57 99/57 99/54   Pulse: 79 75 71 63   Resp: 14 14 14 14   Temp:  98.2 °F (36.8 °C) 98.8 °F (37.1 °C) 98.3 °F (36.8 °C)   SpO2: 100% 96% 98% 97%   Weight:       Height:         Intake and Output:   03/20 1901 - 03/22 0700  In: -   Out: 850 [Urine:400]       Physical Exam:   Constitution:  the patient is well developed and in no acute distress  EENMT:  Sclera clear, pupils equal, oral mucosa moist  Respiratory: = BS bilaterally, clear; no respiratory variation evident from Uresil  Cardiovascular:  RRR without M,G,R  Gastrointestinal: soft and non-tender; with positive bowel sounds. Musculoskeletal: warm without cyanosis. There is no lower leg edema. Skin:  no jaundice or rashes  Neurologic: no gross neuro deficits     Psychiatric:  alert and oriented x 3    CHEST XRAY:           LAB  No results for input(s): GLUCPOC in the last 72 hours. No lab exists for component: GLPOC   Recent Labs      03/21/17   0139   WBC  6.9   HGB  13.7   HCT  40.4*   PLT  196     Recent Labs      03/21/17 0139   NA  146*   K  3.9   CL  109*   CO2  31   GLU  96   BUN  13   CREA  0.97   CA  8.7   ALB  4.2   TBILI  0.2   ALT  26   SGOT  16     No results for input(s): PH, PCO2, PO2, HCO3 in the last 72 hours. No results for input(s): LCAD, LAC in the last 72 hours. Assessment:  (Medical Decision Making)     Hospital Problems  Never Reviewed          Codes Class Noted POA    * (Principal)Pneumothorax, right ICD-10-CM: J93.9  ICD-9-CM: 512.89  3/21/2017 Unknown    Decompressed. Plan:  (Medical Decision Making)     Check CXR this AM.  Cap Uresil and recheck CXR if no PTX. May be able to remove Uresil later this AM.  --    More than 50% of the time documented was spent in face-to-face contact with the patient and in the care of the patient on the floor/unit where the patient is located.     Tutu Gregg MD

## 2017-03-22 NOTE — PROGRESS NOTES
Post cxr review by Dr Haylee Ramírez capped per Dr Octavio Doss. CXR ordered at 1145. Patient aware to notify RN with any change in respiratory status. Amy Suresh RN aware of aforementioned.

## 2017-03-22 NOTE — PROGRESS NOTES
CXR review per Dr Leila Vinson. Pneumothorax larger than this am with Uresi;l capped. Uresil to -20 cm H20 per Dr Leila Vinson. Per Dr Leila Vinson, patient will stay overnight with repeat CXR in am.  I updated patient and wife about plan of care and instructed patient to call for help if SOB or if he needs to ambulate. Connections taped for reinforcement.

## 2017-03-22 NOTE — PROGRESS NOTES
Spoke with Dr. Beau Hughes regarding patient's follow up ultrasound and images were reviewed. Pa/Lat at 5 today with increased small right apical pneumothorax. Place Uresil back to suction and follow up chest xray tomorrow morning. Notified patient's nurse.

## 2017-03-22 NOTE — PROGRESS NOTES
END OF SHIFT NOTE:    INTAKE/OUTPUT  03/21 0701 - 03/22 0700  In: -   Out: 850 [Urine:400]  Voiding: YES  Catheter: NO  Drain:              Flatus: Patient does have flatus present. Stool:  0 occurrences. Characteristics:       Emesis: 0 occurrences. Characteristics:        VITAL SIGNS  Patient Vitals for the past 12 hrs:   Temp Pulse Resp BP SpO2   03/22/17 0300 98.3 °F (36.8 °C) 63 14 99/54 97 %   03/21/17 2333 98.8 °F (37.1 °C) 71 14 99/57 98 %   03/21/17 1951 98.2 °F (36.8 °C) 75 14 102/57 96 %       Pain Assessment  Pain Intensity 1: 0 (03/22/17 0330)  Pain Location 1: Chest, Back  Pain Intervention(s) 1: Medication (see MAR)  Patient Stated Pain Goal: 0    Ambulating  Yes    Shift report given to oncoming nurse at the bedside.     Jeb Skelton RN

## 2017-03-23 ENCOUNTER — APPOINTMENT (OUTPATIENT)
Dept: GENERAL RADIOLOGY | Age: 42
DRG: 143 | End: 2017-03-23
Payer: MEDICAID

## 2017-03-23 ENCOUNTER — APPOINTMENT (OUTPATIENT)
Dept: GENERAL RADIOLOGY | Age: 42
DRG: 143 | End: 2017-03-23
Attending: INTERNAL MEDICINE
Payer: MEDICAID

## 2017-03-23 PROCEDURE — 99232 SBSQ HOSP IP/OBS MODERATE 35: CPT | Performed by: INTERNAL MEDICINE

## 2017-03-23 PROCEDURE — 71010 XR CHEST PORT: CPT

## 2017-03-23 PROCEDURE — 99218 HC RM OBSERVATION: CPT

## 2017-03-23 PROCEDURE — 74011250637 HC RX REV CODE- 250/637: Performed by: INTERNAL MEDICINE

## 2017-03-23 PROCEDURE — 74011250636 HC RX REV CODE- 250/636: Performed by: INTERNAL MEDICINE

## 2017-03-23 RX ADMIN — HYDROCODONE BITARTRATE AND ACETAMINOPHEN 1 TABLET: 7.5; 325 TABLET ORAL at 23:36

## 2017-03-23 RX ADMIN — Medication 10 ML: at 22:51

## 2017-03-23 RX ADMIN — Medication 10 ML: at 05:32

## 2017-03-23 RX ADMIN — HYDROCODONE BITARTRATE AND ACETAMINOPHEN 1 TABLET: 7.5; 325 TABLET ORAL at 13:32

## 2017-03-23 RX ADMIN — ENOXAPARIN SODIUM 40 MG: 40 INJECTION SUBCUTANEOUS at 07:59

## 2017-03-23 RX ADMIN — Medication 10 ML: at 14:00

## 2017-03-23 RX ADMIN — Medication 10 ML: at 13:27

## 2017-03-23 NOTE — PROGRESS NOTES
END OF SHIFT NOTE:    INTAKE/OUTPUT  03/22 0701 - 03/23 0700  In: -   Out: 1400 [Urine:1400]  Voiding: YES  Catheter: NO  Drain:              Flatus: Patient does have flatus present. Stool:  0 occurrences. Characteristics:       Emesis: 0 occurrences. Characteristics:        VITAL SIGNS  Patient Vitals for the past 12 hrs:   Temp Pulse Resp BP SpO2   03/23/17 0330 97.3 °F (36.3 °C) 66 17 (!) 89/52 97 %   03/22/17 2330 98 °F (36.7 °C) 63 17 (!) 89/48 98 %   03/22/17 1953 98.2 °F (36.8 °C) 75 17 97/52 97 %       Pain Assessment  Pain Intensity 1: 0 (03/23/17 0330)  Pain Location 1: Back  Pain Intervention(s) 1: Medication (see MAR)  Patient Stated Pain Goal: 0    Ambulating  Yes    Shift report given to oncoming nurse at the bedside.     Lissette Calixto RN

## 2017-03-23 NOTE — PROGRESS NOTES
Noella Hamman  Admission Date: 3/21/2017             Daily Progress Note: 3/23/2017    The patient's chart is reviewed and the patient is discussed with the staff. 38 yo WM with spontaneous PTX on R. Subjective:     Lying in bed asleep but awakens easily. Denies shortness of breath or trouble breathing. Denies pain. Current Facility-Administered Medications   Medication Dose Route Frequency    sodium chloride (NS) flush 5-10 mL  5-10 mL IntraVENous Q8H    sodium chloride (NS) flush 5-10 mL  5-10 mL IntraVENous PRN    sodium chloride (NS) flush 5-10 mL  5-10 mL IntraVENous Q8H    sodium chloride (NS) flush 5-10 mL  5-10 mL IntraVENous PRN    enoxaparin (LOVENOX) injection 40 mg  40 mg SubCUTAneous Q24H    HYDROcodone-acetaminophen (NORCO) 7.5-325 mg per tablet 1 Tab  1 Tab Oral Q6H PRN    ibuprofen (MOTRIN) tablet 400 mg  400 mg Oral Q6H PRN       Review of Systems  Constitutional: negative for fever, chills, sweats  Cardiovascular: negative for chest pain, palpitations, syncope, edema  Gastrointestinal:  negative for dysphagia, reflux, vomiting, diarrhea, abdominal pain, or melena  Neurologic:  negative for focal weakness, numbness, headache    Objective:     Vitals:    03/22/17 1953 03/22/17 2330 03/23/17 0330 03/23/17 0655   BP: 97/52 (!) 89/48 (!) 89/52 93/49   Pulse: 75 63 66 67   Resp: 17 17 17 16   Temp: 98.2 °F (36.8 °C) 98 °F (36.7 °C) 97.3 °F (36.3 °C) 98 °F (36.7 °C)   SpO2: 97% 98% 97% 98%   Weight:       Height:         Intake and Output:   03/21 1901 - 03/23 0700  In: -   Out: 1800 [Urine:1800]       Physical Exam:   Constitution:  the patient is well developed and in no acute distress, on room air   EENMT:  Sclera clear, pupils equal, oral mucosa moist  Respiratory: clear, Uresil in place   Cardiovascular:  RRR without M,G,R  Gastrointestinal: soft and non-tender; with positive bowel sounds. Musculoskeletal: warm without cyanosis.  There is no lower leg edema.  Skin:  no jaundice or rashes, no open wounds   Neurologic: no gross neuro deficits     Psychiatric:  alert and oriented x 3     CHEST XRAY:       IMPRESSION:  Previously noted right apical pneumothorax has nearly resolved. Tiny residual  right apical pneumothorax remains. There is a stable right apical chest tube. No  pulmonary consolidation, pleural effusion or pulmonary edema. Cardiac  mediastinal contour is within normal limits. Surrounding bones are stable.     IMPRESSION:  Tiny residual right apical pneumothorax is present. . Stable right apical chest  tube. LAB  No results for input(s): GLUCPOC in the last 72 hours. No lab exists for component: GLPOC   Recent Labs      03/21/17   0139   WBC  6.9   HGB  13.7   HCT  40.4*   PLT  196     Recent Labs      03/21/17   0139   NA  146*   K  3.9   CL  109*   CO2  31   GLU  96   BUN  13   CREA  0.97   CA  8.7   ALB  4.2   TBILI  0.2   ALT  26   SGOT  16     No results for input(s): PH, PCO2, PO2, HCO3 in the last 72 hours. No results for input(s): LCAD, LAC in the last 72 hours. Assessment:  (Medical Decision Making)     Hospital Problems  Date Reviewed: 3/23/2017          Codes Class Noted POA    * (Principal)Pneumothorax, right ICD-10-CM: J93.9  ICD-9-CM: 512.89  3/21/2017 Unknown    Uresil to suction. Cap today and will repeat chest xray around 11 this morning. Plan:  (Medical Decision Making)     --CXR this am with nearly resolved ptx   --Cap Uresil   --Recheck CXR later this morning and possibly remove Uresil later today     More than 50% of the time documented was spent in face-to-face contact with the patient and in the care of the patient on the floor/unit where the patient is located. Brandt Daniel NP     Lungs:  clear  Heart:  RRR with no Murmur/Rubs/Gallops    Additional Comments:  F/U CXR today is better. Will put the Uresil off suction and ambulate with F/U CXR in am    I have spoken with and examined the patient.  I agree with the above assessment and plan as documented.     Sonal Deras MD

## 2017-03-24 ENCOUNTER — APPOINTMENT (OUTPATIENT)
Dept: GENERAL RADIOLOGY | Age: 42
DRG: 143 | End: 2017-03-24
Attending: INTERNAL MEDICINE
Payer: MEDICAID

## 2017-03-24 ENCOUNTER — APPOINTMENT (OUTPATIENT)
Dept: GENERAL RADIOLOGY | Age: 42
DRG: 143 | End: 2017-03-24
Payer: MEDICAID

## 2017-03-24 PROCEDURE — 71010 XR CHEST PORT: CPT

## 2017-03-24 PROCEDURE — 65270000029 HC RM PRIVATE

## 2017-03-24 PROCEDURE — 99232 SBSQ HOSP IP/OBS MODERATE 35: CPT | Performed by: INTERNAL MEDICINE

## 2017-03-24 PROCEDURE — 74011250636 HC RX REV CODE- 250/636: Performed by: INTERNAL MEDICINE

## 2017-03-24 PROCEDURE — 99218 HC RM OBSERVATION: CPT

## 2017-03-24 PROCEDURE — 74011250637 HC RX REV CODE- 250/637: Performed by: INTERNAL MEDICINE

## 2017-03-24 RX ADMIN — Medication 10 ML: at 05:09

## 2017-03-24 RX ADMIN — ENOXAPARIN SODIUM 40 MG: 40 INJECTION SUBCUTANEOUS at 07:35

## 2017-03-24 RX ADMIN — IBUPROFEN 400 MG: 400 TABLET, FILM COATED ORAL at 18:53

## 2017-03-24 RX ADMIN — HYDROCODONE BITARTRATE AND ACETAMINOPHEN 1 TABLET: 7.5; 325 TABLET ORAL at 08:49

## 2017-03-24 RX ADMIN — Medication 10 ML: at 22:00

## 2017-03-24 NOTE — PROGRESS NOTES
Repeat CXR reviewed. Small residual R PTX is smaller with the uresil plugged for the past 4 hours. Feel we can pull Uresil and have a repeat CXR done in the morning.      Sharan Erickson MD

## 2017-03-24 NOTE — ADT AUTH CERT NOTES
LOC:Acute Adult-General Medical (3/24/2017) by Kristie Davenport        Review Entered Review Status       3/24/2017 In Primary       Details         REVIEW SUMMARY     Patient: Yadi Res  Review Number: [id number]  Review Status: In Primary     Condition Specific: Yes     Condition Level Of Care Code: ACUTE  Condition Level Of Care Description: Acute        OUTCOMES  Outcome Type: Primary              REVIEW DETAILS     Service Date: 03/24/2017  Admit Date: 03/21/2017  Product: [facility] Adult  Subset: General Medical  (Symptom or finding within 24h)     (Excludes PO medications unless noted)  [X] Select Day, One:  [X] Episode Day 3-X, One:  [X] ACUTE, >= One:  [X] Respiratory, One:  [X] Partial responder, not clinically stable for discharge and requires continued stay, >= One:  [X] Chest tube, >= One:  [X] Water seal within last 24h     Version: [employer] 2016.1  [employer] and [address]  © [employer] and/or one of its [place]. All Rights Reserved. CPT only © 2015 [employer]. All Rights Reserved.              Additional Notes       Daily Progress Note: 3/24/2017               Uresil off suction since 9am yesterday. Patient denies shortness of breath or trouble breathing        On room air.  T 98.2, /51, P 76, R 18, 02 SAT 97% RA       CXR: Stable trace right pneumothorax       Plan: Cap Uresil       --CXR later today.       Lovenox sc q 24 hrs, norco po q 6 hrs prn x 1 (3x/24 hrs),               Status changed to inpatient based upon pt status/tx plan since day of admission & length of stay/

## 2017-03-24 NOTE — PROGRESS NOTES
Post CXR review Uresil removed per Dr Olivia Souza. Explained to patient what to watch for and to call RN in event of any change in respiratory status. CXR ordered tomorrow am by Dr Olivia Souza.

## 2017-03-24 NOTE — PROGRESS NOTES
Tate Pole  Admission Date: 3/21/2017             Daily Progress Note: 3/24/2017    The patient's chart is reviewed and the patient is discussed with the staff. 36 yo WM with spontaneous PTX on R. Subjective:     Uresil off suction since 9am yesterday   Patient denies shortness of breath or trouble breathing   On room air     Current Facility-Administered Medications   Medication Dose Route Frequency    sodium chloride (NS) flush 5-10 mL  5-10 mL IntraVENous Q8H    sodium chloride (NS) flush 5-10 mL  5-10 mL IntraVENous PRN    sodium chloride (NS) flush 5-10 mL  5-10 mL IntraVENous Q8H    sodium chloride (NS) flush 5-10 mL  5-10 mL IntraVENous PRN    enoxaparin (LOVENOX) injection 40 mg  40 mg SubCUTAneous Q24H    HYDROcodone-acetaminophen (NORCO) 7.5-325 mg per tablet 1 Tab  1 Tab Oral Q6H PRN    ibuprofen (MOTRIN) tablet 400 mg  400 mg Oral Q6H PRN       Review of Systems  Constitutional: negative for fever, chills, sweats  Cardiovascular: negative for chest pain, palpitations, syncope, edema  Gastrointestinal:  negative for dysphagia, reflux, vomiting, diarrhea, abdominal pain, or melena  Neurologic:  negative for focal weakness, numbness, headache    Objective:     Vitals:    03/23/17 1930 03/23/17 2336 03/24/17 0300 03/24/17 0716   BP: 105/59 104/62 92/54 101/51   Pulse: 88 90 70 76   Resp: 17 17 17 18   Temp: 98 °F (36.7 °C) 98.9 °F (37.2 °C) 98.6 °F (37 °C) 98.2 °F (36.8 °C)   SpO2: 99% 94% 97% 97%   Weight:       Height:         Intake and Output:   03/22 1901 - 03/24 0700  In: -   Out: 3150 [Urine:3150]       Physical Exam:   Constitution:  the patient is well developed and in no acute distress, on room air   EENMT:  Sclera clear, pupils equal, oral mucosa moist  Respiratory: clear   Cardiovascular:  RRR without M,G,R  Gastrointestinal: soft and non-tender; with positive bowel sounds. Musculoskeletal: warm without cyanosis. There is no lower leg edema.   Skin:  no jaundice or rashes, no open wounds. Uresil to R chest  Neurologic: no gross neuro deficits     Psychiatric:  alert and oriented x 3    CHEST XRAY 3/24/17: IMPRESSION: Stable trace right pneumothorax. LAB  No results for input(s): GLUCPOC in the last 72 hours. No lab exists for component: GLPOC   No results for input(s): WBC, HGB, HCT, PLT, INR, HGBEXT, HCTEXT, PLTEXT, HGBEXT, HCTEXT, PLTEXT in the last 72 hours. No lab exists for component: INREXT, INREXT  No results for input(s): NA, K, CL, CO2, GLU, BUN, CREA, MG, CA, PHOS, TROIQ, ALB, TBIL, TBILI, GPT, ALT, SGOT, BNPP in the last 72 hours. No lab exists for component: TROIP  No results for input(s): PH, PCO2, PO2, HCO3 in the last 72 hours. No results for input(s): LCAD, LAC in the last 72 hours. Assessment:  (Medical Decision Making)     Hospital Problems  Date Reviewed: 3/24/2017          Codes Class Noted POA    * (Principal)Pneumothorax, right ICD-10-CM: J93.9  ICD-9-CM: 512.89  3/21/2017 Unknown    Uresil in place. Stable on CXR           Plan:  (Medical Decision Making)     --Cap Uresil- will notify bronch lab   --CXR later today. More than 50% of the time documented was spent in face-to-face contact with the patient and in the care of the patient on the floor/unit where the patient is located. Arturo Haddad NP       Lungs: clear; no resp variation in Uresil  Heart:  RRR with no Murmur/Rubs/Gallops    Additional Comments:  CXR planned for 3 PM. If no increase in PTX, can pull Uresil. I have spoken with and examined the patient. I agree with the above assessment and plan as documented.     Eddie Dudley MD

## 2017-03-24 NOTE — PROGRESS NOTES
END OF SHIFT NOTE:    INTAKE/OUTPUT  03/23 0701 - 03/24 0700  In: -   Out: 1750 [Urine:1750]  Voiding: YES  Catheter: NO  Drain:              Flatus: Patient does have flatus present. Stool:  0 occurrences. Characteristics:       Emesis: 0 occurrences. Characteristics:        VITAL SIGNS  Patient Vitals for the past 12 hrs:   Temp Pulse Resp BP SpO2   03/24/17 0300 98.6 °F (37 °C) 70 17 92/54 97 %   03/23/17 2336 98.9 °F (37.2 °C) 90 17 104/62 94 %   03/23/17 1930 98 °F (36.7 °C) 88 17 105/59 99 %       Pain Assessment  Pain Intensity 1: 0 (03/24/17 0126)  Pain Location 1: Chest  Pain Intervention(s) 1: Medication (see MAR)  Patient Stated Pain Goal: 0    Ambulating  Yes    Shift report given to oncoming nurse at the bedside.     Myah Garcia RN

## 2017-03-25 ENCOUNTER — APPOINTMENT (OUTPATIENT)
Dept: GENERAL RADIOLOGY | Age: 42
DRG: 143 | End: 2017-03-25
Attending: INTERNAL MEDICINE
Payer: MEDICAID

## 2017-03-25 VITALS
HEART RATE: 73 BPM | TEMPERATURE: 97.9 F | SYSTOLIC BLOOD PRESSURE: 108 MMHG | DIASTOLIC BLOOD PRESSURE: 63 MMHG | BODY MASS INDEX: 19.99 KG/M2 | RESPIRATION RATE: 18 BRPM | HEIGHT: 69 IN | OXYGEN SATURATION: 97 % | WEIGHT: 135 LBS

## 2017-03-25 PROCEDURE — 74011250636 HC RX REV CODE- 250/636: Performed by: INTERNAL MEDICINE

## 2017-03-25 PROCEDURE — 71010 XR CHEST PORT: CPT

## 2017-03-25 PROCEDURE — 99238 HOSP IP/OBS DSCHRG MGMT 30/<: CPT | Performed by: INTERNAL MEDICINE

## 2017-03-25 RX ORDER — HYDROCODONE BITARTRATE AND ACETAMINOPHEN 7.5; 325 MG/1; MG/1
1 TABLET ORAL
Qty: 10 TAB | Refills: 0 | Status: SHIPPED | OUTPATIENT
Start: 2017-03-25 | End: 2017-04-08

## 2017-03-25 RX ADMIN — ENOXAPARIN SODIUM 40 MG: 40 INJECTION SUBCUTANEOUS at 08:11

## 2017-03-25 RX ADMIN — Medication 10 ML: at 05:09

## 2017-03-25 NOTE — PROGRESS NOTES
END OF SHIFT NOTE:    INTAKE/OUTPUT  03/24 0701 - 03/25 0700  In: 0   Out: 2225 [Urine:2225]  Voiding: YES  Catheter: NO  Drain:              Flatus: Patient does have flatus present. Stool:  0 occurrences. Characteristics:       Emesis: 0 occurrences. Characteristics:        VITAL SIGNS  Patient Vitals for the past 12 hrs:   Temp Pulse Resp BP SpO2   03/25/17 0330 98 °F (36.7 °C) 68 17 98/53 97 %   03/24/17 2300 97.9 °F (36.6 °C) 82 18 110/62 97 %   03/24/17 1915 97.9 °F (36.6 °C) 74 18 111/69 97 %       Pain Assessment  Pain Intensity 1: 0 (03/24/17 2011)  Pain Location 1: Head  Pain Intervention(s) 1: Medication (see MAR)  Patient Stated Pain Goal: 0    Ambulating  Yes    Shift report given to oncoming nurse at the bedside.     Carli Xie RN

## 2017-04-02 ENCOUNTER — HOSPITAL ENCOUNTER (INPATIENT)
Age: 42
LOS: 6 days | Discharge: HOME | DRG: 168 | End: 2017-04-08
Attending: INTERNAL MEDICINE | Admitting: INTERNAL MEDICINE
Payer: COMMERCIAL

## 2017-04-02 ENCOUNTER — APPOINTMENT (OUTPATIENT)
Dept: GENERAL RADIOLOGY | Age: 42
DRG: 168 | End: 2017-04-02
Attending: INTERNAL MEDICINE
Payer: COMMERCIAL

## 2017-04-02 ENCOUNTER — APPOINTMENT (OUTPATIENT)
Dept: GENERAL RADIOLOGY | Age: 42
End: 2017-04-02
Attending: EMERGENCY MEDICINE
Payer: MEDICAID

## 2017-04-02 ENCOUNTER — SURGERY (OUTPATIENT)
Age: 42
End: 2017-04-02

## 2017-04-02 ENCOUNTER — HOSPITAL ENCOUNTER (EMERGENCY)
Age: 42
Discharge: HOME | End: 2017-04-02
Attending: EMERGENCY MEDICINE
Payer: MEDICAID

## 2017-04-02 VITALS
WEIGHT: 135 LBS | HEART RATE: 78 BPM | TEMPERATURE: 98.5 F | BODY MASS INDEX: 19.99 KG/M2 | HEIGHT: 69 IN | OXYGEN SATURATION: 100 % | RESPIRATION RATE: 20 BRPM | SYSTOLIC BLOOD PRESSURE: 106 MMHG | DIASTOLIC BLOOD PRESSURE: 66 MMHG

## 2017-04-02 DIAGNOSIS — R07.89 CHEST WALL PAIN FOLLOWING SURGERY: ICD-10-CM

## 2017-04-02 DIAGNOSIS — J93.9 PNEUMOTHORAX, RIGHT: ICD-10-CM

## 2017-04-02 DIAGNOSIS — Z98.890 S/P THORACOTOMY: ICD-10-CM

## 2017-04-02 DIAGNOSIS — J93.9 PNEUMOTHORAX, RIGHT: Primary | ICD-10-CM

## 2017-04-02 DIAGNOSIS — R07.89 CHEST WALL PAIN: ICD-10-CM

## 2017-04-02 DIAGNOSIS — J93.9 PNEUMOTHORAX ON RIGHT: ICD-10-CM

## 2017-04-02 DIAGNOSIS — G89.18 CHEST WALL PAIN FOLLOWING SURGERY: ICD-10-CM

## 2017-04-02 LAB
ALBUMIN SERPL BCP-MCNC: 4.4 G/DL (ref 3.5–5)
ALBUMIN/GLOB SERPL: 1.2 {RATIO} (ref 1.2–3.5)
ALP SERPL-CCNC: 63 U/L (ref 50–136)
ALT SERPL-CCNC: 34 U/L (ref 12–65)
ANION GAP BLD CALC-SCNC: 8 MMOL/L (ref 7–16)
AST SERPL W P-5'-P-CCNC: 20 U/L (ref 15–37)
BASOPHILS # BLD AUTO: 0 K/UL (ref 0–0.2)
BASOPHILS # BLD: 0 % (ref 0–2)
BILIRUB SERPL-MCNC: 0.2 MG/DL (ref 0.2–1.1)
BUN SERPL-MCNC: 15 MG/DL (ref 6–23)
CALCIUM SERPL-MCNC: 9 MG/DL (ref 8.3–10.4)
CHLORIDE SERPL-SCNC: 103 MMOL/L (ref 98–107)
CO2 SERPL-SCNC: 30 MMOL/L (ref 21–32)
CREAT SERPL-MCNC: 1.01 MG/DL (ref 0.8–1.5)
DIFFERENTIAL METHOD BLD: ABNORMAL
EOSINOPHIL # BLD: 0.1 K/UL (ref 0–0.8)
EOSINOPHIL NFR BLD: 2 % (ref 0.5–7.8)
ERYTHROCYTE [DISTWIDTH] IN BLOOD BY AUTOMATED COUNT: 12.9 % (ref 11.9–14.6)
GLOBULIN SER CALC-MCNC: 3.6 G/DL (ref 2.3–3.5)
GLUCOSE SERPL-MCNC: 98 MG/DL (ref 65–100)
HCT VFR BLD AUTO: 41.6 % (ref 41.1–50.3)
HGB BLD-MCNC: 13.9 G/DL (ref 13.6–17.2)
IMM GRANULOCYTES # BLD: 0 K/UL (ref 0–0.5)
IMM GRANULOCYTES NFR BLD AUTO: 0 % (ref 0–5)
LYMPHOCYTES # BLD AUTO: 36 % (ref 13–44)
LYMPHOCYTES # BLD: 2.3 K/UL (ref 0.5–4.6)
MCH RBC QN AUTO: 30.1 PG (ref 26.1–32.9)
MCHC RBC AUTO-ENTMCNC: 33.4 G/DL (ref 31.4–35)
MCV RBC AUTO: 90 FL (ref 79.6–97.8)
MONOCYTES # BLD: 0.7 K/UL (ref 0.1–1.3)
MONOCYTES NFR BLD AUTO: 11 % (ref 4–12)
NEUTS SEG # BLD: 3.3 K/UL (ref 1.7–8.2)
NEUTS SEG NFR BLD AUTO: 51 % (ref 43–78)
PLATELET # BLD AUTO: 182 K/UL (ref 150–450)
PMV BLD AUTO: 10 FL (ref 10.8–14.1)
POTASSIUM SERPL-SCNC: 4.3 MMOL/L (ref 3.5–5.1)
PROT SERPL-MCNC: 8 G/DL (ref 6.3–8.2)
RBC # BLD AUTO: 4.62 M/UL (ref 4.23–5.67)
SODIUM SERPL-SCNC: 141 MMOL/L (ref 136–145)
WBC # BLD AUTO: 6.4 K/UL (ref 4.3–11.1)

## 2017-04-02 PROCEDURE — 77030032490 HC SLV COMPR SCD KNE COVD -B

## 2017-04-02 PROCEDURE — 99284 EMERGENCY DEPT VISIT MOD MDM: CPT | Performed by: EMERGENCY MEDICINE

## 2017-04-02 PROCEDURE — 71020 XR CHEST PA LAT: CPT

## 2017-04-02 PROCEDURE — 99152 MOD SED SAME PHYS/QHP 5/>YRS: CPT | Performed by: INTERNAL MEDICINE

## 2017-04-02 PROCEDURE — 0W9930Z DRAINAGE OF RIGHT PLEURAL CAVITY WITH DRAINAGE DEVICE, PERCUTANEOUS APPROACH: ICD-10-PCS | Performed by: INTERNAL MEDICINE

## 2017-04-02 PROCEDURE — 80053 COMPREHEN METABOLIC PANEL: CPT | Performed by: EMERGENCY MEDICINE

## 2017-04-02 PROCEDURE — 99223 1ST HOSP IP/OBS HIGH 75: CPT | Performed by: INTERNAL MEDICINE

## 2017-04-02 PROCEDURE — 74011250636 HC RX REV CODE- 250/636

## 2017-04-02 PROCEDURE — 85025 COMPLETE CBC W/AUTO DIFF WBC: CPT | Performed by: EMERGENCY MEDICINE

## 2017-04-02 PROCEDURE — 71010 XR CHEST PORT: CPT

## 2017-04-02 PROCEDURE — 74011250636 HC RX REV CODE- 250/636: Performed by: INTERNAL MEDICINE

## 2017-04-02 PROCEDURE — 65270000029 HC RM PRIVATE

## 2017-04-02 PROCEDURE — 32551 INSERTION OF CHEST TUBE: CPT | Performed by: INTERNAL MEDICINE

## 2017-04-02 PROCEDURE — 76040000019: Performed by: INTERNAL MEDICINE

## 2017-04-02 RX ORDER — SODIUM CHLORIDE 0.9 % (FLUSH) 0.9 %
5-10 SYRINGE (ML) INJECTION AS NEEDED
Status: DISCONTINUED | OUTPATIENT
Start: 2017-04-02 | End: 2017-04-08 | Stop reason: HOSPADM

## 2017-04-02 RX ORDER — FENTANYL CITRATE 50 UG/ML
50 INJECTION, SOLUTION INTRAMUSCULAR; INTRAVENOUS
Status: DISCONTINUED | OUTPATIENT
Start: 2017-04-02 | End: 2017-04-04 | Stop reason: ALTCHOICE

## 2017-04-02 RX ORDER — SODIUM CHLORIDE 0.9 % (FLUSH) 0.9 %
5-10 SYRINGE (ML) INJECTION EVERY 8 HOURS
Status: DISCONTINUED | OUTPATIENT
Start: 2017-04-02 | End: 2017-04-08 | Stop reason: HOSPADM

## 2017-04-02 RX ORDER — SODIUM CHLORIDE 0.9 % (FLUSH) 0.9 %
5-10 SYRINGE (ML) INJECTION EVERY 8 HOURS
Status: DISCONTINUED | OUTPATIENT
Start: 2017-04-02 | End: 2017-04-04 | Stop reason: ALTCHOICE

## 2017-04-02 RX ORDER — SODIUM CHLORIDE 0.9 % (FLUSH) 0.9 %
5-10 SYRINGE (ML) INJECTION AS NEEDED
Status: DISCONTINUED | OUTPATIENT
Start: 2017-04-02 | End: 2017-04-02 | Stop reason: HOSPADM

## 2017-04-02 RX ORDER — SODIUM CHLORIDE 0.9 % (FLUSH) 0.9 %
5-10 SYRINGE (ML) INJECTION EVERY 8 HOURS
Status: DISCONTINUED | OUTPATIENT
Start: 2017-04-02 | End: 2017-04-02 | Stop reason: HOSPADM

## 2017-04-02 RX ORDER — MORPHINE SULFATE 2 MG/ML
2 INJECTION, SOLUTION INTRAMUSCULAR; INTRAVENOUS
Status: DISCONTINUED | OUTPATIENT
Start: 2017-04-02 | End: 2017-04-03

## 2017-04-02 RX ORDER — MIDAZOLAM HYDROCHLORIDE 1 MG/ML
2 INJECTION, SOLUTION INTRAMUSCULAR; INTRAVENOUS
Status: DISCONTINUED | OUTPATIENT
Start: 2017-04-02 | End: 2017-04-04 | Stop reason: ALTCHOICE

## 2017-04-02 RX ADMIN — FENTANYL CITRATE 50 MCG: 50 INJECTION, SOLUTION INTRAMUSCULAR; INTRAVENOUS at 16:51

## 2017-04-02 RX ADMIN — Medication 5 ML: at 21:29

## 2017-04-02 RX ADMIN — Medication 5 ML: at 18:29

## 2017-04-02 RX ADMIN — Medication 2 MG: at 18:32

## 2017-04-02 RX ADMIN — Medication 2 MG: at 23:45

## 2017-04-02 RX ADMIN — Medication 5 ML: at 21:30

## 2017-04-02 RX ADMIN — MIDAZOLAM HYDROCHLORIDE 2 MG: 1 INJECTION, SOLUTION INTRAMUSCULAR; INTRAVENOUS at 16:51

## 2017-04-02 NOTE — ED NOTES
Patient discharged last week from [facility] for spontaneous pneumothorax. Patient states coughing last night and felt same pain.

## 2017-04-02 NOTE — INTERVAL H&P NOTE
H&P Update:  Maye Felton was seen and examined. History and physical has been reviewed. The patient has been examined.  There have been no significant clinical changes since the completion of the originally dated History and Physical.    Signed By: Jonathan Cabrera MD     April 2, 2017 5:07 PM

## 2017-04-02 NOTE — ED NOTES
TRANSFER - OUT REPORT:    Verbal report given to Trevor Ortiz on [facility]  being transferred to [address] [place] [address] for routine progression of care       Report consisted of patients Situation, Background, Assessment and   Recommendations(SBAR). Information from the following report(s) SBAR, ED Summary, Procedure Summary, MAR and Recent Results was reviewed with the receiving nurse. Lines:   Peripheral IV 04/02/17 Right Antecubital (Active)   Site Assessment Clean, dry, & intact 4/2/2017  2:52 PM   Phlebitis Assessment 0 4/2/2017  2:52 PM   Infiltration Assessment 0 4/2/2017  2:52 PM   Dressing Status Clean, dry, & intact 4/2/2017  2:52 PM   Dressing Type Transparent 4/2/2017  2:52 PM   Hub Color/Line Status Pink 4/2/2017  2:52 PM        Opportunity for questions and clarification was provided.       Patient transported with:   O2 @ 2 liters

## 2017-04-02 NOTE — PROGRESS NOTES
TRANSFER - OUT REPORT:    Verbal report given to Gwendolyn Bass RN on [facility] for routine progression of care       Report consisted of patients Situation, Background, Assessment and   Recommendations(SBAR). Information from the following report(s) Procedure Summary was reviewed with the receiving nurse. Opportunity for questions and clarification was provided.

## 2017-04-02 NOTE — PROGRESS NOTES
R uresil placed in by Dr Chelsea Ramirez using sterile technique. 2 mg Versed and 50 mcg Fentanyl IV for procedure. Patient tolerated procedure very well. VS monitored throughout procedure. 350 cc air evacuated by Dr Chelsea Ramirez post insertion.

## 2017-04-02 NOTE — ED PROVIDER NOTES
HPI Comments: Patient states he recently had a pneumothorax and feels the same now. Was [place] and had a chest tube. Discharged a week ago. Has had little cough x 2 days. Last night felt a movement in his chest like before. Little SOB. Little CP. No fever. Some nasal congestion. Quit smoking 4 years ago. Patient is a 40 y.o. male presenting with cough. The history is provided by the patient and medical records. Cough   This is a new problem. The current episode started yesterday. The problem has not changed since onset. The cough is productive of sputum. There has been no fever. Associated symptoms include rhinorrhea and shortness of breath. Pertinent negatives include no chest pain, no wheezing, no nausea and no vomiting. He has tried nothing for the symptoms. He is not a smoker. No past medical history on file. Past Surgical History:   Procedure Laterality Date    HX OTHER SURGICAL      pyloric stenosis         No family history on file. Social History     Social History    Marital status:      Spouse name: N/A    Number of children: N/A    Years of education: N/A     Occupational History    Not on file. Social History Main Topics    Smoking status: Former Smoker     Years: 24.00    Smokeless tobacco: Former User     Quit date: 3/21/2013    Alcohol use No    Drug use: No    Sexual activity: Not on file     Other Topics Concern    Not on file     Social History Narrative    No narrative on file         ALLERGIES: Review of patient's allergies indicates no known allergies. Review of Systems   Constitutional: Negative. HENT: Positive for congestion and rhinorrhea. Eyes: Negative. Respiratory: Positive for cough and shortness of breath. Negative for chest tightness and wheezing. Cardiovascular: Negative for chest pain, palpitations and leg swelling. Gastrointestinal: Negative. Negative for nausea and vomiting. Genitourinary: Negative. Musculoskeletal: Negative. Skin: Negative. Neurological: Negative. Hematological: Negative. Psychiatric/Behavioral: Negative. Vitals:    04/02/17 1308   BP: 127/67   Pulse: 87   Resp: 20   Temp: 98.5 °F (36.9 °C)   SpO2: 98%   Weight: 61.2 kg (135 lb)   Height: 5' 9\" (1.753 m)            Physical Exam   Constitutional: He is oriented to person, place, and time. He appears well-developed and well-nourished. HENT:   Head: Normocephalic and atraumatic. Right Ear: External ear normal.   Left Ear: External ear normal.   Mouth/Throat: Oropharynx is clear and moist.   Eyes: Conjunctivae and EOM are normal. Pupils are equal, round, and reactive to light. Neck: Normal range of motion. Neck supple. Cardiovascular: Normal rate, regular rhythm, normal heart sounds and intact distal pulses. Pulmonary/Chest: Effort normal. He has no wheezes. He has no rales. He exhibits no tenderness. Decreased breath sounds right apex   Abdominal: Soft. Bowel sounds are normal. He exhibits no mass. There is no tenderness. Musculoskeletal: Normal range of motion. He exhibits no edema or tenderness. Neurological: He is alert and oriented to person, place, and time. Skin: Skin is warm and dry. Psychiatric: He has a normal mood and affect. His behavior is normal.   Nursing note and vitals reviewed.        MDM  ED Course       Procedures    Right recurrent spontaneous pneumothorax  CXR - recurrent right apical pneumothorax  Labs ordered  Consult Dr. Shamar Conley Pulmonary - transfer [facility] for further treatment  Patient and wife informed of the plan

## 2017-04-02 NOTE — H&P
HISTORY AND PHYSICAL      Kenya Cheek    4/2/2017    Date of Admission:  4/2/2017    The patient's chart is reviewed and the patient is discussed with the staff. Subjective:     Patient is a 40 y.o.  male presents with pneumothorax. He was just admitted here from 3/21-25 for the same. He had a spontaneous right pneumothorax that did not resolve. A 13F Uresil was placed in his right chest with initial complete evacuation of his PTX. Eventually the device was plugged and he had on a tiny, stable residual PTX. The uresil was pulled and CXR the next day showed no reaccumulation. He states that since returning home he contracted an URI from his children. He was coughing more last night when he felt a pop and knew his PTX had recurred. He presented to the [address] ER where a CXR confirmed another PTX again on the right side. He has been transferred downtown for ongoing care. Review of Systems  A comprehensive review of systems was negative except for that written in the HPI. Patient Active Problem List   Diagnosis Code    Pneumothorax, right J93.9    Pneumothorax on right J93.9       Prior to Admission Medications   Prescriptions Last Dose Informant Patient Reported? Taking? HYDROcodone-acetaminophen (NORCO) 7.5-325 mg per tablet 4/2/2017 at Unknown time  No Yes   Sig: Take 1 Tab by mouth every six (6) hours as needed. Max Daily Amount: 4 Tabs. Facility-Administered Medications: None       No past medical history on file. Past Surgical History:   Procedure Laterality Date    HX OTHER SURGICAL      pyloric stenosis     Social History     Social History    Marital status:      Spouse name: N/A    Number of children: N/A    Years of education: N/A     Occupational History    Not on file.      Social History Main Topics    Smoking status: Former Smoker     Years: 24.00    Smokeless tobacco: Former User     Quit date: 3/21/2013    Alcohol use No    Drug use: No    Sexual activity: Not on file     Other Topics Concern    Not on file     Social History Narrative    No narrative on file     No family history on file. No Known Allergies    No current facility-administered medications for this encounter. Objective:     Vitals:    04/02/17 1658   BP: 98/68   Pulse: 68   Resp: 16   SpO2: 100%       PHYSICAL EXAM     Constitutional:  the patient is well developed and in no acute distress  EENMT:  Sclera clear, pupils equal, oral mucosa moist  Respiratory: Decreased BS on right  Cardiovascular:  RRR without M,G,R  Gastrointestinal: soft and non-tender; with positive bowel sounds. Musculoskeletal: warm without cyanosis. There is no lower leg edema. Skin:  no jaundice or rashes, no wounds   Neurologic: no gross neuro deficits     Psychiatric:  alert and oriented x ppt    CXR:  4/2/17  IMPRESSION: Increased small right apical pneumothorax. Recent Labs      04/02/17   1445   WBC  6.4   HGB  13.9   HCT  41.6   PLT  182     Recent Labs      04/02/17   1445   NA  141   K  4.3   CL  103   GLU  98   CO2  30   BUN  15   CREA  1.01   CA  9.0   ALB  4.4   TBILI  0.2   ALT  34   SGOT  20     No results for input(s): PH, PCO2, PO2, HCO3 in the last 72 hours. No results for input(s): LCAD, LAC in the last 72 hours. Assessment:  (Medical Decision Making)     Hospital Problems  Date Reviewed: 4/2/2017          Codes Class Noted POA    Pneumothorax on right ICD-10-CM: J93.9  ICD-9-CM: 512.89  4/2/2017 Unknown        * (Principal)Pneumothorax, right ICD-10-CM: J93.9  ICD-9-CM: 512.89  3/21/2017 Yes            Recurrent spontaneous pneumothorax on right. Previously successfully evacuated with Uresil but with recurrence after coughing fit. Plan:  (Medical Decision Making)     --Will admit for further medical management  --Supplemental O2   --Will place uresil for immediate decompression but then consult surgery in the AM to consider pleurodesis.    --prn pain medications. More than 50% of the time documented was spent in face-to-face contact with the patient and in the care of the patient on the floor/unit where the patient is located.     Glo Herrera MD

## 2017-04-02 NOTE — PROGRESS NOTES
Dual skin assessment performed with Gurmeet Hall RN. Pt skin warm, dry, and intact. Backside intact.

## 2017-04-02 NOTE — PROGRESS NOTES
Pt alert and oriented. Able to make needs known. Wife at bedside. Assessment completed and documented. Call light within easy reach. Will continue to monitor.

## 2017-04-02 NOTE — IP AVS SNAPSHOT
Edith Flow 
 
 
 [address] [facility] 
[phone] Patient: Mervat Ervin MRN: [id number] :1975 You are allergic to the following No active allergies Immunizations Administered for This Admission Name Date Influenza Vaccine (Quad) PF  Deferred () Recent Documentation Smoking Status Former Smoker Emergency Contacts Name Discharge Info Relation Home Work Mobile Amanda Petersen  Spouse [3] [phone] About your hospitalization You were admitted on:  2017 You last received care in the:  [facility] 8 MED SURG You were discharged on:  2017 Unit phone number:  [phone] Why you were hospitalized Your primary diagnosis was:  Pneumothorax, Right Your diagnoses also included:  S/P Thoracotomy, Chest Wall Pain Following Surgery Providers Seen During Your Hospitalizations Provider Role Specialty Primary office phone Adelia Kat MD Attending Provider Pulmonary Disease [phone] Your Primary Care Physician (PCP) Primary Care Physician Office Phone Office Fax Beto Fontanez [phone] Follow-up Information Follow up With Details Comments Contact Info [facility] INTERNAL MEDICINE Go on 4/10/2017 Appointment at 3:00 PM for post-hospitalization follow-up and establish PCP relationship. Appointment is with Dr. Elena Elizabeth. [address] 
[phone] Kim Booth MD   [address] [place] [address] [address]  Axel Mckeon 
[phone] Hoda Miller MD In 10 days follow up [place] [address] 
[phone] Your Appointments Monday April 10, 2017  3:00 PM EDT New Patient with Kim Booth MD  
[facility] INTERNAL MEDICINE ([address] [facility]) [address] [place] [address] [phone] Current Discharge Medication List  
  
START taking these medications Dose & Instructions Dispensing Information Comments Morning Noon Evening Bedtime  
 oxyCODONE-acetaminophen 7.5-325 mg per tablet Commonly known as:  PERCOCET Your last dose was: Your next dose is:    
   
   
 Dose:  1 Tab Take 1 Tab by mouth every four (4) hours as needed for Pain. Max Daily Amount: 6 Tabs. Quantity:  40 Tab Refills:  0 STOP taking these medications HYDROcodone-acetaminophen 7.5-325 mg per tablet Commonly known as:  Durrell Brittle Where to Get Your Medications Information on where to get these meds will be given to you by the nurse or doctor. ! Ask your nurse or doctor about these medications  
  oxyCODONE-acetaminophen 7.5-325 mg per tablet Discharge Instructions DISCHARGE SUMMARY from Nurse The following personal items are in your possession at time of discharge: 
 
Dental Appliances: None Visual Aid: Isacc Mendez Parker Home Medications: Sent home Jewelry: Ring Clothing: Footwear, Pants, Shirt, Undergarments, With patient Other Valuables: Cell Phone Personal Items Sent to Safe: no PATIENT INSTRUCTIONS: 
 
 
F-face looks uneven A-arms unable to move or move unevenly S-speech slurred or non-existent T-time-call 911 as soon as signs and symptoms begin-DO NOT go Back to bed or wait to see if you get better-TIME IS BRAIN. Warning Signs of HEART ATTACK Call 911 if you have these symptoms: 
? Chest discomfort.  Most heart attacks involve discomfort in the center of the chest that lasts more than a few minutes, or that goes away and comes back. It can feel like uncomfortable pressure, squeezing, fullness, or pain. ? Discomfort in other areas of the upper body. Symptoms can include pain or discomfort in one or both arms, the back, neck, jaw, or stomach. ? Shortness of breath with or without chest discomfort. ? Other signs may include breaking out in a cold sweat, nausea, or lightheadedness. Don't wait more than five minutes to call [address]! Fast action can save your life. Calling 911 is almost always the fastest way to get lifesaving treatment. Emergency Medical Services staff can begin treatment when they arrive  up to an hour sooner than if someone gets to the hospital by car. The discharge information has been reviewed with the patient. The patient verbalized understanding. Discharge medications reviewed with the patient and appropriate educational materials and side effects teaching were provided. Collapsed Lung: Care Instructions Your Care Instructions A collapsed lung (pneumothorax) is a buildup of air in the space between the lung and the chest wall. As more air builds up in this space, the pressure against the lung makes the lung collapse. This causes shortness of breath and chest pain because your lung cannot fully expand. A collapsed lung is usually caused by an injury to the chest, but it may also occur suddenly without an injury because of a lung illness, such as emphysema or lung fibrosis. Your lung may collapse after lung surgery or another medical procedure. Sometimes it happens for no known reason in an otherwise healthy person (spontaneous pneumothorax). Treatment depends on the cause of the collapse. It may heal with rest, although your doctor will want to keep track of your progress. It can take several days for the lung to expand again.  Your doctor may have drained the air with a needle or tube inserted into the space between your chest and the collapsed lung. If you have a chest tube, be sure to follow your doctor's instructions about how to care for the tube. You may need further treatment if you are not getting better. Surgery is sometimes needed to keep the lung inflated. The doctor will want to keep track of your progress, so you will need a follow-up exam within a few days. The doctor has checked you carefully, but problems can develop later. If you notice any problems or new symptoms, get medical treatment right away. Follow-up care is a key part of your treatment and safety. Be sure to make and go to all appointments, and call your doctor if you are having problems. It's also a good idea to know your test results and keep a list of the medicines you take. How can you care for yourself at home? · Get plenty of rest and sleep. You may feel weak and tired for a while, but your energy level will improve with time. · Hold a pillow against your chest when you cough or take deep breaths. This will support your chest and decrease your pain. · Take pain medicines exactly as directed. ¨ If the doctor gave you a prescription medicine for pain, take it as prescribed. ¨ If you are not taking a prescription pain medicine, ask your doctor if you can take an over-the-counter medicine. · If your doctor prescribed antibiotics, take them as directed. Do not stop taking them just because you feel better. You need to take the full course of antibiotics. · If you have a bandage over your chest tube, or the place where the chest tube was inserted, keep it clean and dry. Follow your doctor's instructions on bandage care. · If you go home with a tube in place, follow the doctor's directions. Do not adjust the tube in any way. This could break the seal or cause other problems. Keep the tube dry.  
· Avoid any movements that require your muscles, especially your chest muscles, to strain. Such movements include laughing hard, bearing down to have a bowel movement, and heavy lifting. Try not to cough. · Do not fly in an airplane until your doctor tells you it is okay. Avoid any situations where there is increased air pressure. · Do not smoke or allow others to smoke around you. If you need help quitting, talk to your doctor about stop-smoking programs and medicines. These can increase your chances of quitting for good. When should you call for help? Call 911 anytime you think you may need emergency care. For example, call if: 
· You have severe trouble breathing. · You passed out (lost consciousness). Call your doctor now or seek immediate medical care if: 
· You have new or worse trouble breathing. · You have new pain or your pain gets worse. · You have a fever. · You cough up blood. · Your chest tube starts to come out or falls out. · You are bleeding through the bandage where the tube was put in. Watch closely for changes in your health, and be sure to contact your doctor if: · The skin around the place where the chest tube was put in is red or irritated. · You do not get better as expected. Where can you learn more? Go to http://mirna-melissa.info/. Enter Q300 in the search box to learn more about \"Collapsed Lung: Care Instructions. \" Current as of: May 23, 2016 Content Version: 11.2 © 9833-1167 eTutor. Care instructions adapted under license by Appwapp (which disclaims liability or warranty for this information). If you have questions about a medical condition or this instruction, always ask your healthcare professional. [place] [address] any warranty or liability for your use of this information. Chest Tube: What to Expect at [facility] Your Recovery A chest tube is placed through the chest wall between two ribs.  You had a chest tube put in to help your collapsed lung expand. The tube was removed before you came home. You may have some pain in your chest from the cut (incision) where the tube was put in. For most people, the pain goes away after about 2 weeks. You will have a bandage taped over the wound. Your doctor will remove the bandage and examine the wound in about 2 days. It will take about 3 to 4 weeks for your incision to heal completely. It may leave a small scar that will fade with time. This care sheet gives you a general idea about how long it will take for you to recover. But each person recovers at a different pace. Follow the steps below to feel better as quickly as possible. How can you care for yourself at home? Activity · Rest when you feel tired. Getting enough sleep will help you recover. · Try to walk each day. Start by walking a little more than you did the day before. Bit by bit, increase the amount you walk. Walking boosts blood flow and helps prevent pneumonia and constipation. · Avoid strenuous activities, such as bicycle riding, jogging, weight lifting, or aerobic exercise, until your doctor says it is okay. · How soon you can return to work or your normal routine depends on what health problems you have. Talk with your doctor about how long it will take you to recover. · You may shower after your bandage is removed. Pat the cut (incision) dry. Do not take a bath for 2 weeks after your chest tube is out, or until your doctor tells you it is okay. · Practice deep breathing exercises as directed by your doctor. Coughing exercises also can help drain fluid out of your chest. 
Diet · You can eat your normal diet. If your stomach is upset, try bland, low-fat foods like plain rice, broiled chicken, toast, and yogurt. · Drink plenty of fluids (unless your doctor tells you not to). Medicines · Your doctor will tell you if and when you can restart your medicines.  He or she will also give you instructions about taking any new medicines. · If you take blood thinners, such as warfarin (Coumadin), clopidogrel (Plavix), or aspirin, be sure to talk to your doctor. He or she will tell you if and when to start taking those medicines again. Make sure that you understand exactly what your doctor wants you to do. · Be safe with medicines. Take pain medicines exactly as directed. ¨ If the doctor gave you a prescription medicine for pain, take it as prescribed. ¨ If you are not taking a prescription pain medicine, ask your doctor if you can take an over-the-counter medicine. · Take your antibiotics as directed. Do not stop taking them just because you feel better. You need to take the full course of antibiotics. Incision care · Keep the incision dry as it heals. You will have a bandage over it to help the incision heal and protect it. Your doctor will tell you how to take care of this. Other instructions · Do not smoke. Smoking makes lung problems worse. If you need help quitting, talk to your doctor about stop-smoking programs and medicines. These can increase your chances of quitting for good. Follow-up care is a key part of your treatment and safety. Be sure to make and go to all appointments, and call your doctor if you are having problems. It's also a good idea to know your test results and keep a list of the medicines you take. When should you call for help? Call 911 anytime you think you may need emergency care. For example, call if: 
· You passed out (lost consciousness). · You have severe trouble breathing. · You have sudden chest pain and shortness of breath, or you cough up blood. Call your doctor now or seek immediate medical care if: 
· You continue to have trouble breathing. · Your shortness of breath is getting worse. · Bright red blood soaks through the bandage over your incision. · You have a fever. Watch closely for any changes in your health, and be sure to contact your doctor if: 
· You do not get better as expected. Where can you learn more? Go to http://mirna-melissa.info/. Enter P269 in the search box to learn more about \"Chest Tube: What to Expect at Home. \" Current as of: May 23, 2016 Content Version: 11.2 © 2468-0115 RSB SPINE. Care instructions adapted under license by Wetradetogether (which disclaims liability or warranty for this information). If you have questions about a medical condition or this instruction, always ask your healthcare professional. [place] [address] any warranty or liability for your use of this information. Discharge Orders None [employer] Announcement We are excited to announce that we are making your provider's discharge notes available to you in [employer]. You will see these notes when they are completed and signed by the physician that discharged you from your recent hospital stay. If you have any questions or concerns about any information you see in [employer], please call the Health Information Department where you were seen or reach out to your Primary Care Provider for more information about your plan of care. Introducing [facility] & HEALTH SERVICES! Yung Padron introduces [employer] patient portal. Now you can access parts of your medical record, email your doctor's office, and request medication refills online. 1. In your internet browser, go to [email or web address] 2. Click on the First Time User? Click Here link in the Sign In box. You will see the New Member Sign Up page. 3. Enter your [employer] Access Code exactly as it appears below. You will not need to use this code after youve completed the sign-up process. If you do not sign up before the expiration date, you must request a new code. · [employer] Access Code: [email or web address] Expires: 6/19/2017 12:10 AM 
 
 4. Enter the last four digits of your Social Security Number (xxxx) and Date of Birth (mm/dd/yyyy) as indicated and click Submit. You will be taken to the next sign-up page. 5. Create a Multifonds ID. This will be your Mult[employer]onds login ID and cannot be changed, so think of one that is secure and easy to remember. 6. Create a Mult[employer]onds password. You can change your password at any time. 7. Enter your Password Reset Question and Answer. This can be used at a later time if you forget your password. 8. Enter your e-mail address. You will receive e-mail notification when new information is available in [address] 9. Click Sign Up. You can now view and download portions of your medical record. 10. Click the Download Summary menu link to download a portable copy of your medical information. If you have questions, please visit the Frequently Asked Questions section of the Multifonds website. Remember, [employer] is NOT to be used for urgent needs. For medical emergencies, dial 911. Now available from your iPhone and Android! General Information Please provide this summary of care documentation to your next provider. Patient Signature:  ____________________________________________________________ Date:  ____________________________________________________________  
  
[place] [facility] Provider Signature:  ____________________________________________________________ Date:  ____________________________________________________________

## 2017-04-02 NOTE — PROCEDURES
PROCEDURE:     13F Ure-Bianca device placement  CPT Code 15100    INDICATION:    RIGHT PNEUMOTHORAX    DESCRIPTION:    After obtaining informed consent from the patient, the right chest was prepped with chlorhexidine in the usual fashion. The third intercostal space was then localized and 1% lidocaine was injected over the underlying rib (5 cc total) anesthetizing the skin, subcutaneous tissue, rib, and intercostal muscles. The depth of the pleural space was then noted by aspirating air into the syringe. The provided scalpel was used to make a 3-mm incision in the needle insertion area. After assuring adequate hemostasis, the 13 F Ure-Bianca device was introduced into the chest using the provided trocar in the kit in the usual fashion. The adhesive type protector was then removed and the hole device affixed to the patient's chest.  The valve within the device was seen moving up and down with respiration. A one-way valve provided in the kit was then attached and air was removed with a 60 cc syringe to a total of 350 cc. The suction port was then closed with the provided cap. There were no complications, the patient tolerated the procedure well. RECOMMENDATION:    Placement CXR has been ordered.  Will consult surgery for evaluation in the Ramírez MD Jose

## 2017-04-02 NOTE — IP AVS SNAPSHOT
Current Discharge Medication List  
  
START taking these medications Dose & Instructions Dispensing Information Comments Morning Noon Evening Bedtime  
 oxyCODONE-acetaminophen 7.5-325 mg per tablet Commonly known as:  PERCOCET Your last dose was: Your next dose is:    
   
   
 Dose:  1 Tab Take 1 Tab by mouth every four (4) hours as needed for Pain. Max Daily Amount: 6 Tabs. Quantity:  40 Tab Refills:  0 STOP taking these medications HYDROcodone-acetaminophen 7.5-325 mg per tablet Commonly known as:  Louise Burton Where to Get Your Medications Information on where to get these meds will be given to you by the nurse or doctor. ! Ask your nurse or doctor about these medications  
  oxyCODONE-acetaminophen 7.5-325 mg per tablet

## 2017-04-03 ENCOUNTER — ANESTHESIA EVENT (OUTPATIENT)
Dept: SURGERY | Age: 42
DRG: 168 | End: 2017-04-03
Payer: COMMERCIAL

## 2017-04-03 ENCOUNTER — APPOINTMENT (OUTPATIENT)
Dept: GENERAL RADIOLOGY | Age: 42
DRG: 168 | End: 2017-04-03
Attending: INTERNAL MEDICINE
Payer: COMMERCIAL

## 2017-04-03 PROCEDURE — 99232 SBSQ HOSP IP/OBS MODERATE 35: CPT | Performed by: INTERNAL MEDICINE

## 2017-04-03 PROCEDURE — 74011250636 HC RX REV CODE- 250/636: Performed by: INTERNAL MEDICINE

## 2017-04-03 PROCEDURE — 65270000029 HC RM PRIVATE

## 2017-04-03 PROCEDURE — 71010 XR CHEST PORT: CPT

## 2017-04-03 PROCEDURE — 74011250637 HC RX REV CODE- 250/637: Performed by: INTERNAL MEDICINE

## 2017-04-03 RX ORDER — MORPHINE SULFATE 10 MG/ML
5 INJECTION, SOLUTION INTRAMUSCULAR; INTRAVENOUS
Status: DISCONTINUED | OUTPATIENT
Start: 2017-04-03 | End: 2017-04-07

## 2017-04-03 RX ORDER — OXYCODONE AND ACETAMINOPHEN 10; 325 MG/1; MG/1
1 TABLET ORAL
Status: DISCONTINUED | OUTPATIENT
Start: 2017-04-03 | End: 2017-04-07

## 2017-04-03 RX ADMIN — Medication 5 ML: at 05:33

## 2017-04-03 RX ADMIN — MORPHINE SULFATE 5 MG: 10 INJECTION INTRAMUSCULAR; INTRAVENOUS; SUBCUTANEOUS at 12:58

## 2017-04-03 RX ADMIN — MORPHINE SULFATE 5 MG: 10 INJECTION INTRAMUSCULAR; INTRAVENOUS; SUBCUTANEOUS at 09:01

## 2017-04-03 RX ADMIN — Medication 5 ML: at 21:16

## 2017-04-03 RX ADMIN — OXYCODONE HYDROCHLORIDE AND ACETAMINOPHEN 1 TABLET: 10; 325 TABLET ORAL at 17:44

## 2017-04-03 RX ADMIN — Medication 5 ML: at 13:18

## 2017-04-03 NOTE — PROGRESS NOTES
Pt doesn't feel like morphine is helping with his pain in his rib cage where the urasil was put in at all. Still reports extreme pain. Dr. Magdy FORDE notified and morphine increased to 5 mg IVP q4h prn.

## 2017-04-03 NOTE — PROGRESS NOTES
Patient was recently discharged from [facility] after hospitalization for pneumothorax. He is normally independent in all ADLs. He is expected to return home at discharge. Patient does not currently have a PCP. An appointment has been scheduled through Critical access hospital with Dr. Nicole Sommers at [facility] Internal Medicine, [facility] for 4/10/2017 at 3:00 pm for follow-up and to establish PCP relationship. If, for some reason, patient is expected to still be hospitalized at that time, we will reschedule the appointment. Case Management will continue to follow.     Care Management Interventions  Transition of Care Consult (CM Consult): Discharge Planning  Discharge Durable Medical Equipment: No  Physical Therapy Consult: No  Occupational Therapy Consult: No  Speech Therapy Consult: No  Current Support Network: Own Home  Confirm Follow Up Transport: Family  Plan discussed with Pt/Family/Caregiver: Yes  Freedom of Choice Offered: Yes  Discharge Location  Discharge Placement: Home

## 2017-04-03 NOTE — PROGRESS NOTES
Pt resting in bed and is alert and oriented x 4. he c/o pain in his ribcage and knows he was just given morphine by day shift nurse and is on room air. RR even and unlabored. Urasil dressing clean, dry, and intact. Bilateral SCDs on. Call light in reach and pt instructed to call for assistance if needed. Will monitor. Family at bedside.

## 2017-04-03 NOTE — PROGRESS NOTES
[facility]  H&P/Consult Note     Korey Coronado  MRN: [id number]  :1975  Age:42 y.o.    HPI: Korey Coronado is a 40 y.o.  male who we are asked to evaluate by the Pulmonary service due to recurrent spontaneous R pneumothorax. He reports that he was hospitalized at [facility] from 3/21/17 - 3/25/17 for management of a spontaneous R PTX. He had a Uresil device placed at that time. PTX resolved and he was discharged home. He reports having a recent URI with cough. He reports that yesterday he was coughing when he felt a pop and knew his PTX had returned. He came to the ED for evaluation. CXR revealed a recurrence of his R PTX. He was admitted by [facility] and again had a Uresil device placed. He denies having severe pain or SOB. Pain is mostly related to Uresil placement. He denies other medical problems. Past surgery was as an infant r/t pyloric stenosis. X-smoker, no ETOH. Exam: Single view of the chest -- 4/3/17  INDICATION: Follow-up pneumothorax, respiratory failure  COMPARISON: 2017  FINDINGS:  Smallbore right apical chest tube remains in place. Suspect tiny residual right  apical pneumothorax, significantly improved from previous exam. The cardiac  silhouette is stable. No evidence of developing infiltrate or effusion. IMPRESSION:  Suspect tiny residual right apical pneumothorax, improved from prior exam.      No past medical history on file.   Past Surgical History:   Procedure Laterality Date    HX OTHER SURGICAL      pyloric stenosis     Current Facility-Administered Medications   Medication Dose Route Frequency    morphine injection 5 mg  5 mg IntraVENous Q4H PRN    sodium chloride (NS) flush 5-10 mL  5-10 mL IntraVENous Q8H    sodium chloride (NS) flush 5-10 mL  5-10 mL IntraVENous PRN    sodium chloride (NS) flush 5-10 mL  5-10 mL IntraVENous Q8H    sodium chloride (NS) flush 5-10 mL  5-10 mL IntraVENous PRN    midazolam (VERSED) injection 2 mg  2 mg IntraVENous Multiple    fentaNYL citrate (PF) injection 50 mcg  50 mcg IntraVENous Multiple    influenza vaccine 2016-17 (36mos+)(PF) (FLUZONE/FLUARIX/FLULAVAL QUAD) injection 0.5 mL  0.5 mL IntraMUSCular PRIOR TO DISCHARGE     Review of patient's allergies indicates no known allergies. Social History     Social History    Marital status:      Spouse name: N/A    Number of children: N/A    Years of education: N/A     Social History Main Topics    Smoking status: Former Smoker     Years: 24.00    Smokeless tobacco: Former User     Quit date: 3/21/2013    Alcohol use No    Drug use: No    Sexual activity: Not on file     Other Topics Concern    Not on file     Social History Narrative    No narrative on file     History   Smoking Status    Former Smoker    Years: 24.00   Smokeless Tobacco    Former User    Quit date: 3/21/2013     No family history on file. ROS: The patient has no difficulty with mild chest/ribcage pain and shortness of breath PTA. No fever or chills. Comprehensive review of systems was otherwise unremarkable except as noted above. Physical Exam:   Visit Vitals    /55    Pulse 71    Temp 98.7 °F (37.1 °C)    Resp 18    SpO2 97%     Constitutional: Alert, oriented, cooperative patient in no acute distress; appears stated age    Eyes: Sclera are clear. EOMs intact  ENMT: No external lesions, gross hearing normal; no obvious neck masses, no ear or lip lesions, nares normal  CV: RRR, S1S2. Normal perfusion, pulses palpable  Resp: No JVD. Breathing is non-labored; no wheezing, BBS diminished R>L, Uresil device in place, on RA, no SOB. GI: Flat, soft and non-distended, non-tender, +BS     Musculoskeletal: Unremarkable with normal function. No embolic signs or cyanosis.    Neuro: Alert, oriented; moves all 4; no focal deficits  Psychiatric: Flat affect and mood, no memory impairment    Recent vitals (if inpt):  Patient Vitals for the past 24 hrs:   BP Temp Pulse Resp SpO2 04/03/17 0730 104/55 98.7 °F (37.1 °C) 71 18 97 %   04/03/17 0305 - - - 18 -   04/03/17 0249 102/54 98.7 °F (37.1 °C) 70 - 97 %   04/02/17 2351 - - - 18 -   04/02/17 2316 110/56 98.9 °F (37.2 °C) 70 - 97 %   04/02/17 1946 - - - 16 -   04/02/17 1936 104/57 98.6 °F (37 °C) 83 16 97 %   04/02/17 1735 106/61 - 61 14 98 %   04/02/17 1726 104/60 - 65 15 97 %   04/02/17 1720 106/68 - 72 14 98 %   04/02/17 1715 112/69 - 65 15 98 %   04/02/17 1703 103/67 - 70 15 100 %   04/02/17 1658 98/68 - 68 16 100 %   04/02/17 1650 100/61 - 63 14 100 %       Labs:  Recent Labs      04/02/17   1445   WBC  6.4   HGB  13.9   PLT  182   NA  141   K  4.3   CL  103   CO2  30   BUN  15   CREA  1.01   GLU  98   TBILI  0.2   SGOT  20   ALT  34   AP  63       Lab Results   Component Value Date/Time    WBC 6.4 04/02/2017 02:45 PM    HGB 13.9 04/02/2017 02:45 PM    PLATELET 889 36/35/2429 02:45 PM    Sodium 141 04/02/2017 02:45 PM    Potassium 4.3 04/02/2017 02:45 PM    Chloride 103 04/02/2017 02:45 PM    CO2 30 04/02/2017 02:45 PM    BUN 15 04/02/2017 02:45 PM    Creatinine 1.01 04/02/2017 02:45 PM    Glucose 98 04/02/2017 02:45 PM    Bilirubin, total 0.2 04/02/2017 02:45 PM    AST (SGOT) 20 04/02/2017 02:45 PM    ALT (SGPT) 34 04/02/2017 02:45 PM    Alk. phosphatase 63 04/02/2017 02:45 PM       Admission date (for inpatients): 4/2/2017   1 Day Post-Op  Procedure(s):  CHEST TUBES INSERTION    ASSESSMENT/PLAN:  Problem List  Date Reviewed: 4/2/2017          Codes Class Noted    Pneumothorax on right ICD-10-CM: J93.9  ICD-9-CM: 512.89  4/2/2017        * (Principal)Pneumothorax, right ICD-10-CM: J93.9  ICD-9-CM: 512.89  3/21/2017            Principal Problem:    Pneumothorax, right (3/21/2017)       PLAN:    Cont management -- per Philomena Alex management -- per Pulm  Pain control PRN    Discussed pleurodesis -- he is agreeable as he wants to \"get this fixed. \"  Further recommendations following evaluation by Dr. Cummins Fraction be able to schedule as early as tomorrow afternoon      Signed:  Reina Alvarado, SAMANTA-C    I have seen and independently examined this patient in addition to the Nurse Practitioner and I formulated the assessment and plan and communicated the plan to her for the completion of the above note. The plan will include going ahead with VATS and pleurodesis of the right lung. He has experienced pain and discomfort and is \"tired\" of his situation and doesn't want to deal with this anymore. He doesn't want a recurrence and we discussed surgery at length. The risks and benefits of pleurodesis as well as the long term consequences of having your lung \"glued\" to the chest wall. He expressed understanding and wishes to proceed with surgery. I will plan to do this on 04/04/2017 on the add on list.  I explained what this was to the patient and that it may be later in the day or evening before this is accomplished. He was agreeable and will sign informed consent. I have discussed the plan with the patient and they are in agreement. If they have any additional questions in the interim I have asked them to notify the nurse to call me. Erin Angelo MD, FACS  General and Bariatric Surgery  [facility].

## 2017-04-03 NOTE — PROGRESS NOTES
Philomena Trotter  Admission Date: 4/2/2017             Daily Progress Note: 4/3/2017   Patient is a 40 y.o.  male presents with pneumothorax. He was just admitted here from 3/21-25 for the same. He had a spontaneous right pneumothorax that did not resolve. A 13F Uresil was placed in his right chest with initial complete evacuation of his PTX. Eventually the device was plugged and he had on a tiny, stable residual PTX. The uresil was pulled and CXR the next day showed no reaccumulation. He states that since returning home he contracted an URI from his children. He was coughing more last night when he felt a pop and knew his PTX had recurred. He presented to the [address] ER where a CXR confirmed another PTX again on the right side. He has been transferred downtown for ongoing care.    Subjective:     Surgery saw him and plans for pleurodesis tomorrow    Review of Systems  Respiratory: positive for pleurisy/chest pain    Current Facility-Administered Medications   Medication Dose Route Frequency    morphine injection 5 mg  5 mg IntraVENous Q4H PRN    sodium chloride (NS) flush 5-10 mL  5-10 mL IntraVENous Q8H    sodium chloride (NS) flush 5-10 mL  5-10 mL IntraVENous PRN    sodium chloride (NS) flush 5-10 mL  5-10 mL IntraVENous Q8H    sodium chloride (NS) flush 5-10 mL  5-10 mL IntraVENous PRN    midazolam (VERSED) injection 2 mg  2 mg IntraVENous Multiple    fentaNYL citrate (PF) injection 50 mcg  50 mcg IntraVENous Multiple    influenza vaccine 2016-17 (36mos+)(PF) (FLUZONE/FLUARIX/FLULAVAL QUAD) injection 0.5 mL  0.5 mL IntraMUSCular PRIOR TO DISCHARGE         Objective:     Vitals:    04/02/17 2351 04/03/17 0249 04/03/17 0305 04/03/17 0730   BP:  102/54  104/55   Pulse:  70  71   Resp: 18  18 18   Temp:  98.7 °F (37.1 °C)  98.7 °F (37.1 °C)   SpO2:  97%  97%     Intake and Output:           Physical Exam:          GEN: well developed and in no acute distress, Oxygen per RA  HEENT: no alar flaring or epistaxis, oral mucosa moist without cyanosis,   NECK:  no JVD, no retractions, no thyromegaly or masses,   LUNGS:  Clear bilaterally on NC  HEART:  RRR with no M,G,R;  ABDOMEN:  soft with no tenderness; positive bowel sounds present  EXTREMITIES:  warm with no cyanosis, no lower leg edema  SKIN:  no jaundice or ecchymosis   NEURO: Alert and oriented X 3     Lines/Drains: IV     Nutrition: regular     CHEST XRAY:         LAB  Recent Labs      04/02/17   1445   WBC  6.4   HGB  13.9   HCT  41.6   PLT  182     Recent Labs      04/02/17   1445   NA  141   K  4.3   CL  103   CO2  30   GLU  98   BUN  15   CREA  1.01         Assessment:     Patient Active Problem List   Diagnosis Code    Pneumothorax, right J93.9    Pneumothorax on right J93.9       Plan     Hospital Problems  Date Reviewed: 4/2/2017          Codes Class Noted POA    * (Principal)Pneumothorax, right ICD-10-CM: J93.9  ICD-9-CM: 512.89  3/21/2017 Yes            -- hopefully for pleurodesis tomorrow per surgery  --pain control    Karolina Luis NP    More than 50% of time documented was spent in face-to-face contact with the patient and in the care of the patient on the floor/unit where the patient is located. Lungs:  clear  Heart:  RRR with no Murmur/Rubs/Gallops    Additional Comments:  Control pain and ask surgery to  See for pleurodesis     I have spoken with and examined the patient. I agree with the above assessment and plan as documented.     Castillo Galvan MD

## 2017-04-04 ENCOUNTER — APPOINTMENT (OUTPATIENT)
Dept: GENERAL RADIOLOGY | Age: 42
DRG: 168 | End: 2017-04-04
Attending: SURGERY
Payer: COMMERCIAL

## 2017-04-04 ENCOUNTER — SURGERY (OUTPATIENT)
Age: 42
End: 2017-04-04

## 2017-04-04 ENCOUNTER — ANESTHESIA (OUTPATIENT)
Dept: SURGERY | Age: 42
DRG: 168 | End: 2017-04-04
Payer: COMMERCIAL

## 2017-04-04 ENCOUNTER — APPOINTMENT (OUTPATIENT)
Dept: GENERAL RADIOLOGY | Age: 42
DRG: 168 | End: 2017-04-04
Attending: INTERNAL MEDICINE
Payer: COMMERCIAL

## 2017-04-04 LAB
ABO + RH BLD: NORMAL
BLOOD GROUP ANTIBODIES SERPL: NORMAL
SPECIMEN EXP DATE BLD: NORMAL

## 2017-04-04 PROCEDURE — 77030020782 HC GWN BAIR PAWS FLX 3M -B: Performed by: ANESTHESIOLOGY

## 2017-04-04 PROCEDURE — 74011250636 HC RX REV CODE- 250/636: Performed by: SURGERY

## 2017-04-04 PROCEDURE — 77030008703 HC TU ET UNCUF COVD -A: Performed by: ANESTHESIOLOGY

## 2017-04-04 PROCEDURE — 77030008522 HC TBNG INSUF LAPRO STRY -B: Performed by: SURGERY

## 2017-04-04 PROCEDURE — 74011250637 HC RX REV CODE- 250/637: Performed by: ANESTHESIOLOGY

## 2017-04-04 PROCEDURE — 77030008756 HC TU IRR SUC STRY -B: Performed by: SURGERY

## 2017-04-04 PROCEDURE — 71010 XR CHEST PORT: CPT

## 2017-04-04 PROCEDURE — 74011250636 HC RX REV CODE- 250/636: Performed by: INTERNAL MEDICINE

## 2017-04-04 PROCEDURE — 0BJQ4ZZ INSPECTION OF PLEURA, PERCUTANEOUS ENDOSCOPIC APPROACH: ICD-10-PCS | Performed by: SURGERY

## 2017-04-04 PROCEDURE — 74011250636 HC RX REV CODE- 250/636: Performed by: ANESTHESIOLOGY

## 2017-04-04 PROCEDURE — 74011000250 HC RX REV CODE- 250

## 2017-04-04 PROCEDURE — 77030035045 HC TRCR ENDOSC VRSPRT BLDLSS COVD -B: Performed by: SURGERY

## 2017-04-04 PROCEDURE — 76010000161 HC OR TIME 1 TO 1.5 HR INTENSV-TIER 1: Performed by: SURGERY

## 2017-04-04 PROCEDURE — 76060000033 HC ANESTHESIA 1 TO 1.5 HR: Performed by: SURGERY

## 2017-04-04 PROCEDURE — 77030011640 HC PAD GRND REM COVD -A: Performed by: SURGERY

## 2017-04-04 PROCEDURE — 77030008467 HC STPLR SKN COVD -B: Performed by: SURGERY

## 2017-04-04 PROCEDURE — 74011000250 HC RX REV CODE- 250: Performed by: SURGERY

## 2017-04-04 PROCEDURE — 77030022474 HC RELD STPLR ENDO GIA COVD -C: Performed by: SURGERY

## 2017-04-04 PROCEDURE — 86900 BLOOD TYPING SEROLOGIC ABO: CPT | Performed by: ANESTHESIOLOGY

## 2017-04-04 PROCEDURE — 77030035048 HC TRCR ENDOSC OPTCL COVD -B: Performed by: SURGERY

## 2017-04-04 PROCEDURE — 88307 TISSUE EXAM BY PATHOLOGIST: CPT | Performed by: SURGERY

## 2017-04-04 PROCEDURE — 99232 SBSQ HOSP IP/OBS MODERATE 35: CPT | Performed by: INTERNAL MEDICINE

## 2017-04-04 PROCEDURE — 77030008477 HC STYL SATN SLP COVD -A: Performed by: ANESTHESIOLOGY

## 2017-04-04 PROCEDURE — 76010000149 HC OR TIME 1 TO 1.5 HR: Performed by: SURGERY

## 2017-04-04 PROCEDURE — 77030031139 HC SUT VCRL2 J&J -A: Performed by: SURGERY

## 2017-04-04 PROCEDURE — 76210000017 HC OR PH I REC 1.5 TO 2 HR: Performed by: SURGERY

## 2017-04-04 PROCEDURE — 77030035051: Performed by: SURGERY

## 2017-04-04 PROCEDURE — 36415 COLL VENOUS BLD VENIPUNCTURE: CPT | Performed by: ANESTHESIOLOGY

## 2017-04-04 PROCEDURE — 77030005424 HC CATH THOR GTNG -A: Performed by: SURGERY

## 2017-04-04 PROCEDURE — 77030014028 HC TALC INTPLR SCLR BRYN -C: Performed by: SURGERY

## 2017-04-04 PROCEDURE — 0BBK4ZX EXCISION OF RIGHT LUNG, PERCUTANEOUS ENDOSCOPIC APPROACH, DIAGNOSTIC: ICD-10-PCS | Performed by: SURGERY

## 2017-04-04 PROCEDURE — 65270000029 HC RM PRIVATE

## 2017-04-04 PROCEDURE — 3E0L3GC INTRODUCTION OF OTHER THERAPEUTIC SUBSTANCE INTO PLEURAL CAVITY, PERCUTANEOUS APPROACH: ICD-10-PCS | Performed by: SURGERY

## 2017-04-04 PROCEDURE — 0W9930Z DRAINAGE OF RIGHT PLEURAL CAVITY WITH DRAINAGE DEVICE, PERCUTANEOUS APPROACH: ICD-10-PCS | Performed by: SURGERY

## 2017-04-04 PROCEDURE — 77030012390 HC DRN CHST BTL GTNG -B: Performed by: SURGERY

## 2017-04-04 PROCEDURE — 74011250636 HC RX REV CODE- 250/636

## 2017-04-04 PROCEDURE — 77030022473 HC HNDL ENDO GIA UNIV USDA -C: Performed by: SURGERY

## 2017-04-04 RX ORDER — HYDROMORPHONE HYDROCHLORIDE 1 MG/ML
1 INJECTION, SOLUTION INTRAMUSCULAR; INTRAVENOUS; SUBCUTANEOUS
Status: DISCONTINUED | OUTPATIENT
Start: 2017-04-04 | End: 2017-04-07

## 2017-04-04 RX ORDER — ONDANSETRON 2 MG/ML
INJECTION INTRAMUSCULAR; INTRAVENOUS AS NEEDED
Status: DISCONTINUED | OUTPATIENT
Start: 2017-04-04 | End: 2017-04-04 | Stop reason: HOSPADM

## 2017-04-04 RX ORDER — PROPOFOL 10 MG/ML
INJECTION, EMULSION INTRAVENOUS AS NEEDED
Status: DISCONTINUED | OUTPATIENT
Start: 2017-04-04 | End: 2017-04-04 | Stop reason: HOSPADM

## 2017-04-04 RX ORDER — FENTANYL CITRATE 50 UG/ML
INJECTION, SOLUTION INTRAMUSCULAR; INTRAVENOUS AS NEEDED
Status: DISCONTINUED | OUTPATIENT
Start: 2017-04-04 | End: 2017-04-04 | Stop reason: HOSPADM

## 2017-04-04 RX ORDER — NALOXONE HYDROCHLORIDE 0.4 MG/ML
0.1 INJECTION, SOLUTION INTRAMUSCULAR; INTRAVENOUS; SUBCUTANEOUS
Status: DISCONTINUED | OUTPATIENT
Start: 2017-04-04 | End: 2017-04-04 | Stop reason: HOSPADM

## 2017-04-04 RX ORDER — HEPARIN SODIUM 5000 [USP'U]/ML
5000 INJECTION, SOLUTION INTRAVENOUS; SUBCUTANEOUS EVERY 12 HOURS
Status: DISCONTINUED | OUTPATIENT
Start: 2017-04-04 | End: 2017-04-08 | Stop reason: HOSPADM

## 2017-04-04 RX ORDER — ONDANSETRON 2 MG/ML
4 INJECTION INTRAMUSCULAR; INTRAVENOUS
Status: DISCONTINUED | OUTPATIENT
Start: 2017-04-04 | End: 2017-04-08 | Stop reason: HOSPADM

## 2017-04-04 RX ORDER — OXYCODONE AND ACETAMINOPHEN 5; 325 MG/1; MG/1
1 TABLET ORAL
Status: DISCONTINUED | OUTPATIENT
Start: 2017-04-04 | End: 2017-04-07

## 2017-04-04 RX ORDER — SODIUM CHLORIDE 0.9 % (FLUSH) 0.9 %
5-10 SYRINGE (ML) INJECTION EVERY 8 HOURS
Status: DISCONTINUED | OUTPATIENT
Start: 2017-04-04 | End: 2017-04-04 | Stop reason: ALTCHOICE

## 2017-04-04 RX ORDER — SODIUM CHLORIDE 0.9 % (FLUSH) 0.9 %
5-10 SYRINGE (ML) INJECTION AS NEEDED
Status: DISCONTINUED | OUTPATIENT
Start: 2017-04-04 | End: 2017-04-04 | Stop reason: HOSPADM

## 2017-04-04 RX ORDER — BUPIVACAINE HYDROCHLORIDE AND EPINEPHRINE 2.5; 5 MG/ML; UG/ML
INJECTION, SOLUTION EPIDURAL; INFILTRATION; INTRACAUDAL; PERINEURAL AS NEEDED
Status: DISCONTINUED | OUTPATIENT
Start: 2017-04-04 | End: 2017-04-04 | Stop reason: HOSPADM

## 2017-04-04 RX ORDER — HEPARIN SODIUM 5000 [USP'U]/ML
5000 INJECTION, SOLUTION INTRAVENOUS; SUBCUTANEOUS
Status: COMPLETED | OUTPATIENT
Start: 2017-04-04 | End: 2017-04-04

## 2017-04-04 RX ORDER — KETOROLAC TROMETHAMINE 30 MG/ML
30 INJECTION, SOLUTION INTRAMUSCULAR; INTRAVENOUS
Status: DISPENSED | OUTPATIENT
Start: 2017-04-04 | End: 2017-04-05

## 2017-04-04 RX ORDER — NEOSTIGMINE METHYLSULFATE 1 MG/ML
INJECTION INTRAVENOUS AS NEEDED
Status: DISCONTINUED | OUTPATIENT
Start: 2017-04-04 | End: 2017-04-04 | Stop reason: HOSPADM

## 2017-04-04 RX ORDER — FLUMAZENIL 0.1 MG/ML
0.2 INJECTION INTRAVENOUS
Status: DISCONTINUED | OUTPATIENT
Start: 2017-04-04 | End: 2017-04-04 | Stop reason: HOSPADM

## 2017-04-04 RX ORDER — CEFAZOLIN SODIUM IN 0.9 % NACL 2 G/50 ML
2 INTRAVENOUS SOLUTION, PIGGYBACK (ML) INTRAVENOUS
Status: COMPLETED | OUTPATIENT
Start: 2017-04-04 | End: 2017-04-04

## 2017-04-04 RX ORDER — NALOXONE HYDROCHLORIDE 0.4 MG/ML
0.4 INJECTION, SOLUTION INTRAMUSCULAR; INTRAVENOUS; SUBCUTANEOUS AS NEEDED
Status: DISCONTINUED | OUTPATIENT
Start: 2017-04-04 | End: 2017-04-08 | Stop reason: HOSPADM

## 2017-04-04 RX ORDER — ROCURONIUM BROMIDE 10 MG/ML
INJECTION, SOLUTION INTRAVENOUS AS NEEDED
Status: DISCONTINUED | OUTPATIENT
Start: 2017-04-04 | End: 2017-04-04 | Stop reason: HOSPADM

## 2017-04-04 RX ORDER — LIDOCAINE HYDROCHLORIDE 20 MG/ML
INJECTION, SOLUTION EPIDURAL; INFILTRATION; INTRACAUDAL; PERINEURAL AS NEEDED
Status: DISCONTINUED | OUTPATIENT
Start: 2017-04-04 | End: 2017-04-04 | Stop reason: HOSPADM

## 2017-04-04 RX ORDER — KETOROLAC TROMETHAMINE 30 MG/ML
30 INJECTION, SOLUTION INTRAMUSCULAR; INTRAVENOUS
Status: COMPLETED | OUTPATIENT
Start: 2017-04-04 | End: 2017-04-04

## 2017-04-04 RX ORDER — SODIUM CHLORIDE 0.9 % (FLUSH) 0.9 %
5-10 SYRINGE (ML) INJECTION AS NEEDED
Status: DISCONTINUED | OUTPATIENT
Start: 2017-04-04 | End: 2017-04-08 | Stop reason: HOSPADM

## 2017-04-04 RX ORDER — GLYCOPYRROLATE 0.2 MG/ML
INJECTION INTRAMUSCULAR; INTRAVENOUS AS NEEDED
Status: DISCONTINUED | OUTPATIENT
Start: 2017-04-04 | End: 2017-04-04 | Stop reason: HOSPADM

## 2017-04-04 RX ORDER — HYDROMORPHONE HYDROCHLORIDE 2 MG/ML
0.5 INJECTION, SOLUTION INTRAMUSCULAR; INTRAVENOUS; SUBCUTANEOUS
Status: DISCONTINUED | OUTPATIENT
Start: 2017-04-04 | End: 2017-04-04 | Stop reason: HOSPADM

## 2017-04-04 RX ORDER — SODIUM CHLORIDE, SODIUM LACTATE, POTASSIUM CHLORIDE, CALCIUM CHLORIDE 600; 310; 30; 20 MG/100ML; MG/100ML; MG/100ML; MG/100ML
25 INJECTION, SOLUTION INTRAVENOUS CONTINUOUS
Status: DISCONTINUED | OUTPATIENT
Start: 2017-04-04 | End: 2017-04-05

## 2017-04-04 RX ORDER — CEFAZOLIN SODIUM 1 G/3ML
2 INJECTION, POWDER, FOR SOLUTION INTRAMUSCULAR; INTRAVENOUS
Status: DISCONTINUED | OUTPATIENT
Start: 2017-04-04 | End: 2017-04-04 | Stop reason: CLARIF

## 2017-04-04 RX ORDER — NALBUPHINE HYDROCHLORIDE 20 MG/ML
5 INJECTION, SOLUTION INTRAMUSCULAR; INTRAVENOUS; SUBCUTANEOUS
Status: DISCONTINUED | OUTPATIENT
Start: 2017-04-04 | End: 2017-04-04 | Stop reason: HOSPADM

## 2017-04-04 RX ORDER — DEXAMETHASONE SODIUM PHOSPHATE 4 MG/ML
INJECTION, SOLUTION INTRA-ARTICULAR; INTRALESIONAL; INTRAMUSCULAR; INTRAVENOUS; SOFT TISSUE AS NEEDED
Status: DISCONTINUED | OUTPATIENT
Start: 2017-04-04 | End: 2017-04-04 | Stop reason: HOSPADM

## 2017-04-04 RX ORDER — ONDANSETRON 2 MG/ML
4 INJECTION INTRAMUSCULAR; INTRAVENOUS
Status: DISCONTINUED | OUTPATIENT
Start: 2017-04-04 | End: 2017-04-04 | Stop reason: HOSPADM

## 2017-04-04 RX ORDER — OXYCODONE HYDROCHLORIDE 5 MG/1
5 TABLET ORAL
Status: COMPLETED | OUTPATIENT
Start: 2017-04-04 | End: 2017-04-04

## 2017-04-04 RX ADMIN — KETOROLAC TROMETHAMINE 30 MG: 30 INJECTION, SOLUTION INTRAMUSCULAR at 21:26

## 2017-04-04 RX ADMIN — Medication 5 ML: at 05:58

## 2017-04-04 RX ADMIN — HEPARIN SODIUM 5000 UNITS: 5000 INJECTION, SOLUTION INTRAVENOUS; SUBCUTANEOUS at 23:49

## 2017-04-04 RX ADMIN — HYDROMORPHONE HYDROCHLORIDE 0.5 MG: 2 INJECTION, SOLUTION INTRAMUSCULAR; INTRAVENOUS; SUBCUTANEOUS at 21:18

## 2017-04-04 RX ADMIN — HEPARIN SODIUM 5000 UNITS: 5000 INJECTION, SOLUTION INTRAVENOUS; SUBCUTANEOUS at 17:56

## 2017-04-04 RX ADMIN — NEOSTIGMINE METHYLSULFATE 3 MG: 1 INJECTION INTRAVENOUS at 20:38

## 2017-04-04 RX ADMIN — MORPHINE SULFATE 5 MG: 10 INJECTION INTRAMUSCULAR; INTRAVENOUS; SUBCUTANEOUS at 04:09

## 2017-04-04 RX ADMIN — PROPOFOL 200 MG: 10 INJECTION, EMULSION INTRAVENOUS at 19:38

## 2017-04-04 RX ADMIN — HYDROMORPHONE HYDROCHLORIDE 0.5 MG: 2 INJECTION, SOLUTION INTRAMUSCULAR; INTRAVENOUS; SUBCUTANEOUS at 21:08

## 2017-04-04 RX ADMIN — ONDANSETRON 4 MG: 2 INJECTION INTRAMUSCULAR; INTRAVENOUS at 20:26

## 2017-04-04 RX ADMIN — CEFAZOLIN 2 G: 1 INJECTION, POWDER, FOR SOLUTION INTRAMUSCULAR; INTRAVENOUS; PARENTERAL at 20:08

## 2017-04-04 RX ADMIN — ROCURONIUM BROMIDE 40 MG: 10 INJECTION, SOLUTION INTRAVENOUS at 19:38

## 2017-04-04 RX ADMIN — SODIUM CHLORIDE, SODIUM LACTATE, POTASSIUM CHLORIDE, AND CALCIUM CHLORIDE 25 ML/HR: 600; 310; 30; 20 INJECTION, SOLUTION INTRAVENOUS at 17:56

## 2017-04-04 RX ADMIN — OXYCODONE HYDROCHLORIDE 5 MG: 5 TABLET ORAL at 22:15

## 2017-04-04 RX ADMIN — DEXAMETHASONE SODIUM PHOSPHATE 10 MG: 4 INJECTION, SOLUTION INTRA-ARTICULAR; INTRALESIONAL; INTRAMUSCULAR; INTRAVENOUS; SOFT TISSUE at 20:07

## 2017-04-04 RX ADMIN — GLYCOPYRROLATE 0.4 MG: 0.2 INJECTION INTRAMUSCULAR; INTRAVENOUS at 20:38

## 2017-04-04 RX ADMIN — BUPIVACAINE HYDROCHLORIDE AND EPINEPHRINE BITARTRATE 10 ML: 2.5; .005 INJECTION, SOLUTION EPIDURAL; INFILTRATION; INTRACAUDAL; PERINEURAL at 20:20

## 2017-04-04 RX ADMIN — LIDOCAINE HYDROCHLORIDE 100 MG: 20 INJECTION, SOLUTION EPIDURAL; INFILTRATION; INTRACAUDAL; PERINEURAL at 19:38

## 2017-04-04 RX ADMIN — Medication 5 ML: at 23:49

## 2017-04-04 RX ADMIN — FENTANYL CITRATE 100 MCG: 50 INJECTION, SOLUTION INTRAMUSCULAR; INTRAVENOUS at 19:36

## 2017-04-04 NOTE — ANESTHESIA PREPROCEDURE EVALUATION
Anesthetic History   No history of anesthetic complications            Review of Systems / Medical History  Patient summary reviewed and pertinent labs reviewed    Pulmonary          Smoker (24 pk years.  Quit 3 yrs ago)      Comments: Spontaneous PNX   Neuro/Psych   Within defined limits           Cardiovascular  Within defined limits                Exercise tolerance: >4 METS  Comments: Denies CV history   GI/Hepatic/Renal  Within defined limits              Endo/Other  Within defined limits           Other Findings            Physical Exam    Airway  Mallampati: II  TM Distance: 4 - 6 cm  Neck ROM: normal range of motion   Mouth opening: Normal     Cardiovascular    Rhythm: regular  Rate: normal         Dental    Dentition: Caps/crowns     Pulmonary  Breath sounds clear to auscultation               Abdominal  GI exam deferred       Other Findings            Anesthetic Plan    ASA: 2  Anesthesia type: general          Induction: Intravenous  Anesthetic plan and risks discussed with: Patient

## 2017-04-04 NOTE — PROGRESS NOTES
Admit Date: 2017    POD Day of Surgery    Procedure:  Procedure(s):  RIGHT VATS/ PLEURODESIS/ 831     Subjective:     Patient has no new complaints. Objective:     Blood pressure 104/63, pulse 85, temperature 98.2 °F (36.8 °C), resp. rate 18, SpO2 96 %. Temp (24hrs), Av °F (36.7 °C), Min:96.4 °F (35.8 °C), Max:98.8 °F (37.1 °C)      Physical Exam:  GENERAL: alert, cooperative, no distress, appears stated age, LUNG: clear to auscultation bilaterally, HEART: regular rate and rhythm, ABDOMEN: soft, non-tender. Bowel sounds normal. No masses,  no organomegaly, EXTREMITIES:  extremities normal, atraumatic, no cyanosis or edema    Labs:   Recent Results (from the past 24 hour(s))   TYPE & SCREEN    Collection Time: 17  7:39 AM   Result Value Ref Range    Crossmatch Expiration 2017     ABO/Rh(D) Anuja Hardin POSITIVE     Antibody screen NEG        Data Review images and reports reviewed    Assessment:     Principal Problem:    Pneumothorax, right (3/21/2017)        Plan/Recommendations/Medical Decision Making:     VATS to right chest for recurrent PTX. I have discussed the risks and benfits with patient and his wife. They both express understanding and wish to proceed. Cleared to proceed.       Syed Wyatt MD

## 2017-04-04 NOTE — PROGRESS NOTES
Pt doesn't have a pcp. Emailed Appointment Line and pt will be assigned a pcp. Sw remains available.

## 2017-04-04 NOTE — PROGRESS NOTES
In bed with unlabored respirations. Denies pain at this time. Head of bed elevated. Denies needs at this time.

## 2017-04-04 NOTE — PROGRESS NOTES
Shift assessment completed. Pt. alert and oriented x4. Room air. pleurex catheter to right chest c/d/i. No acute distress noted. Clear liquids until 10am then NPO after. Call light in reach. Instructed to call for assistance.

## 2017-04-05 ENCOUNTER — APPOINTMENT (OUTPATIENT)
Dept: GENERAL RADIOLOGY | Age: 42
DRG: 168 | End: 2017-04-05
Attending: SURGERY
Payer: COMMERCIAL

## 2017-04-05 PROBLEM — Z98.890 S/P THORACOTOMY: Status: ACTIVE | Noted: 2017-04-05

## 2017-04-05 LAB
BASOPHILS # BLD AUTO: 0 K/UL (ref 0–0.2)
BASOPHILS # BLD: 0 % (ref 0–2)
DIFFERENTIAL METHOD BLD: ABNORMAL
EOSINOPHIL # BLD: 0 K/UL (ref 0–0.8)
EOSINOPHIL NFR BLD: 0 % (ref 0.5–7.8)
ERYTHROCYTE [DISTWIDTH] IN BLOOD BY AUTOMATED COUNT: 12.3 % (ref 11.9–14.6)
HCT VFR BLD AUTO: 36.6 % (ref 41.1–50.3)
HGB BLD-MCNC: 12.7 G/DL (ref 13.6–17.2)
IMM GRANULOCYTES # BLD: 0 K/UL (ref 0–0.5)
IMM GRANULOCYTES NFR BLD AUTO: 0.1 % (ref 0–5)
LYMPHOCYTES # BLD AUTO: 13 % (ref 13–44)
LYMPHOCYTES # BLD: 1.4 K/UL (ref 0.5–4.6)
MCH RBC QN AUTO: 30.1 PG (ref 26.1–32.9)
MCHC RBC AUTO-ENTMCNC: 34.7 G/DL (ref 31.4–35)
MCV RBC AUTO: 86.7 FL (ref 79.6–97.8)
MONOCYTES # BLD: 0.8 K/UL (ref 0.1–1.3)
MONOCYTES NFR BLD AUTO: 7 % (ref 4–12)
NEUTS SEG # BLD: 8.5 K/UL (ref 1.7–8.2)
NEUTS SEG NFR BLD AUTO: 80 % (ref 43–78)
PLATELET # BLD AUTO: 210 K/UL (ref 150–450)
PMV BLD AUTO: 9.9 FL (ref 10.8–14.1)
RBC # BLD AUTO: 4.22 M/UL (ref 4.23–5.67)
WBC # BLD AUTO: 10.7 K/UL (ref 4.3–11.1)

## 2017-04-05 PROCEDURE — 74011250636 HC RX REV CODE- 250/636: Performed by: INTERNAL MEDICINE

## 2017-04-05 PROCEDURE — 77030027138 HC INCENT SPIROMETER -A

## 2017-04-05 PROCEDURE — 99232 SBSQ HOSP IP/OBS MODERATE 35: CPT | Performed by: INTERNAL MEDICINE

## 2017-04-05 PROCEDURE — 65270000029 HC RM PRIVATE

## 2017-04-05 PROCEDURE — 74011250636 HC RX REV CODE- 250/636: Performed by: SURGERY

## 2017-04-05 PROCEDURE — 85025 COMPLETE CBC W/AUTO DIFF WBC: CPT | Performed by: SURGERY

## 2017-04-05 PROCEDURE — 74011250637 HC RX REV CODE- 250/637: Performed by: SURGERY

## 2017-04-05 PROCEDURE — 74011250637 HC RX REV CODE- 250/637: Performed by: INTERNAL MEDICINE

## 2017-04-05 PROCEDURE — 36415 COLL VENOUS BLD VENIPUNCTURE: CPT | Performed by: SURGERY

## 2017-04-05 PROCEDURE — 71010 XR CHEST PORT: CPT

## 2017-04-05 RX ORDER — SENNOSIDES 8.6 MG/1
1 TABLET ORAL DAILY
Status: DISCONTINUED | OUTPATIENT
Start: 2017-04-06 | End: 2017-04-05

## 2017-04-05 RX ORDER — DOCUSATE SODIUM 100 MG/1
100 CAPSULE, LIQUID FILLED ORAL 2 TIMES DAILY
Status: DISCONTINUED | OUTPATIENT
Start: 2017-04-05 | End: 2017-04-08 | Stop reason: HOSPADM

## 2017-04-05 RX ORDER — SENNOSIDES 8.6 MG/1
2 TABLET ORAL
Status: DISCONTINUED | OUTPATIENT
Start: 2017-04-05 | End: 2017-04-08 | Stop reason: HOSPADM

## 2017-04-05 RX ORDER — SODIUM CHLORIDE 9 MG/ML
100 INJECTION, SOLUTION INTRAVENOUS CONTINUOUS
Status: DISCONTINUED | OUTPATIENT
Start: 2017-04-05 | End: 2017-04-05

## 2017-04-05 RX ORDER — IBUPROFEN 200 MG
400 TABLET ORAL 3 TIMES DAILY
Status: DISCONTINUED | OUTPATIENT
Start: 2017-04-05 | End: 2017-04-05

## 2017-04-05 RX ORDER — PANTOPRAZOLE SODIUM 40 MG/1
40 TABLET, DELAYED RELEASE ORAL
Status: DISCONTINUED | OUTPATIENT
Start: 2017-04-06 | End: 2017-04-08 | Stop reason: HOSPADM

## 2017-04-05 RX ORDER — IBUPROFEN 200 MG
600 TABLET ORAL 3 TIMES DAILY
Status: DISCONTINUED | OUTPATIENT
Start: 2017-04-05 | End: 2017-04-08 | Stop reason: HOSPADM

## 2017-04-05 RX ADMIN — HEPARIN SODIUM 5000 UNITS: 5000 INJECTION, SOLUTION INTRAVENOUS; SUBCUTANEOUS at 11:15

## 2017-04-05 RX ADMIN — OXYCODONE HYDROCHLORIDE AND ACETAMINOPHEN 1 TABLET: 10; 325 TABLET ORAL at 21:47

## 2017-04-05 RX ADMIN — OXYCODONE HYDROCHLORIDE AND ACETAMINOPHEN 1 TABLET: 5; 325 TABLET ORAL at 02:24

## 2017-04-05 RX ADMIN — DOCUSATE SODIUM 100 MG: 100 CAPSULE, LIQUID FILLED ORAL at 16:56

## 2017-04-05 RX ADMIN — SODIUM CHLORIDE 100 ML/HR: 900 INJECTION, SOLUTION INTRAVENOUS at 10:02

## 2017-04-05 RX ADMIN — HEPARIN SODIUM 5000 UNITS: 5000 INJECTION, SOLUTION INTRAVENOUS; SUBCUTANEOUS at 21:50

## 2017-04-05 RX ADMIN — Medication 5 ML: at 05:59

## 2017-04-05 RX ADMIN — OXYCODONE HYDROCHLORIDE AND ACETAMINOPHEN 1 TABLET: 10; 325 TABLET ORAL at 05:59

## 2017-04-05 RX ADMIN — Medication 10 ML: at 16:57

## 2017-04-05 RX ADMIN — IBUPROFEN 600 MG: 200 TABLET, FILM COATED ORAL at 21:47

## 2017-04-05 RX ADMIN — Medication 5 ML: at 21:48

## 2017-04-05 RX ADMIN — KETOROLAC TROMETHAMINE 30 MG: 30 INJECTION, SOLUTION INTRAMUSCULAR at 13:57

## 2017-04-05 RX ADMIN — SENNOSIDES 17.2 MG: 8.6 TABLET, FILM COATED ORAL at 21:47

## 2017-04-05 RX ADMIN — IBUPROFEN 600 MG: 200 TABLET, FILM COATED ORAL at 16:50

## 2017-04-05 RX ADMIN — OXYCODONE HYDROCHLORIDE AND ACETAMINOPHEN 1 TABLET: 10; 325 TABLET ORAL at 11:20

## 2017-04-05 NOTE — PROGRESS NOTES
Shift assessment completed. Pt. Alert and oriented this am. Chest tube c/d/i. No air leak noted. Set to 20cm of suction. Small amt. Of crepitus noted around chest tube site. Connections sealed. Will monitor. Sanguinous drainange noted in chest tube chamber. Pt. Comfortable at this time. Call light in reach. Instructed to call for assistance.

## 2017-04-05 NOTE — PROGRESS NOTES
TRANSFER - IN REPORT:    Verbal report received from Vitaly Baires RN (name) on Kishor Lorenzo  being received from PACU (unit) for routine post - op      Report consisted of patients Situation, Background, Assessment and   Recommendations(SBAR). Information from the following report(s) SBAR, Kardex, OR Summary, Procedure Summary, Intake/Output and MAR was reviewed with the receiving nurse. Opportunity for questions and clarification was provided. Assessment completed upon patients arrival to unit and care assumed.

## 2017-04-05 NOTE — OP NOTES
Operative Note   4/4/2017    Dolly Rivera  [id number]    PREPROCEDURE DIAGNOSIS: Recurrent Right Spontaneous pneumothorax. POSTPROCEDURE DIAGNOSIS: Same     NAME OF PROCEDURE: Right VATS procedure with right pleurodesis/Apical Bleb resection/Right Chest tube thoracostomy. SURGEON: Jamie Perez MD, FACS    ANESTHESIA: GETA, double lumen. SPECIMENS: Right apical bleb. .     ASSISTANT: None. FINDINGS:  The patient had bubbling at the apex and an obvious bleb at the apex that was resected. No further evidence of blebs were noted. The patient does have findings consistent with changes due to smoking. COMPLICATIONS: None. BRIEF HISTORY: The patient is a 45-year-old who was recently found to have a right spontaneous PTX. Conservative treatment was performed and he was discharged after the removal of a small anterior chest tube (DART). Unfortunately he returned with a recurrent PTX and symptomatic. A second chest tube was placed and he was admitted. I was consulted for discussion of the physiology of this process. After a lengthy discussion about the risks and benefits of conservative vs. Surgical management the patient chooses to proceed with surgery. PROCEDURE: The patient was evaluated in the preoperative holding area. We reviewed the planned consent, which had been signed already. All questions were answered. He was brought to the operating room and placed supine on the operating room table. Arms were tucked at the sides. He received antibiotics preoperatively. GETA, Double Lumen anesthesia was also administered. The upper RIGHT chest was prepped and draped in a sterile fashion. I began by evaluating the chest wall and marking appropriate anatomic landmarks for trocar placement. Time out was performed and agreed upon by all with patient, location, procedure, allergies discussed.  I identified the right posterior axillary and anterior axillary line at the level of the 7 th intercostal space. I injected above the rib and performed a 5 mm incision with an 11 blade scalpel. I dissected bluntly with a hemastat until I was down to the pleura. I used a syringe with local anesthetic to confirm this layer with bubbles seen indicated a pneumothorax. I entered the pleura bluntly with a hemastat and placed the first 5 mm trocar without the obturator in the appropriate space. The 5 mm angled scope was used to visualize the delated lung surface after requesting anesthesia drop this side of the lung just prior to trocar placement. I placed two addition 5 mm trocars in the anterior axillary line at the 6 and 8 rib levels after performing a nerve block under direct thoroscopic vision. 0.25% Marcaine was used to anesthetize the skin and subcutaneous tissues. The camera was changed to the lower medial port. With blunt graspers I was able to elevate the apex and did note bubbling prior to elevating it. I changed the lateral port to a 12 mm and used a vascular stapler to resect the apical bleb and a cuff of more normal appearing tissue. It was air tide with saline submersion and some inflation of the lung. Reexamination prior to closure confirmed hemastasis. I then inserted a Ray Mendez guaze into the chest and performed abrasion pleurodesis. Afterward I sprayed talc as discussed with the patient into the chest.    I then under direct vision placed a 28 Turkish chest tube posteriorly and approximately 4 cm short of the apex to prevent significant pain from apical rubbing. I was then satisfied the specimen was handed off and the guaze removed an accounted for. The chest tube was secured with 0-Prolene suture to the skin and the 5 mm ports closed with 3-0 Vicryl and covered with dermabond. Sterile dressing were placed as well as vaseline guaze around the chest tube exit site. The dressing were secured with silk tape and the connection to the pleuravac was secured with silk tape as well. I removed the right anterior Uresil chest tube placed by Pulmonology and covered sterilely and secured with a tegaderm. The patient was taken to recovery in stable condition. X-ray will be performed to confirm chest tube placement and follow up on the previous PTX. The sponge, instrument, and needle counts were correct.        Rose Zhang MD  4/4/2017

## 2017-04-05 NOTE — PROGRESS NOTES
Annita Valencia  Admission Date: 4/2/2017             Daily Progress Note: 4/5/2017    The patient's chart is reviewed and the patient is discussed with the staff. 40 y.o. presents to [facility] where CXR confirmed right pneumothorax. He had an URI recently, was coughing at home when he felt a pop and knew his PTX had recurred. Was moved downtown for further treatment. He was just admitted here from 3/21-25 for a spontaneous right pneumothorax, a right Uresil was placed with initial complete evacuation of his PTX. The device was plugged and he had on a tiny, stable residual PTX. The uresil was pulled and CXR the next day showed no reaccumulation. Surgery was consulted and right VAT was performed 4/4. Subjective:     Sitting up in bed, surgical soreness and breathing \"OK\" at rest.  Rare cough. Received IVF bolus for concern of hypotension--states he runs low BPs chronically and is asymptomatic. Wife at the bedside.     Current Facility-Administered Medications   Medication Dose Route Frequency    0.9% sodium chloride infusion  100 mL/hr IntraVENous CONTINUOUS    lactated ringers infusion  25 mL/hr IntraVENous CONTINUOUS    sodium chloride (NS) flush 5-10 mL  5-10 mL IntraVENous PRN    oxyCODONE-acetaminophen (PERCOCET) 5-325 mg per tablet 1 Tab  1 Tab Oral Q4H PRN    ketorolac (TORADOL) injection 30 mg  30 mg IntraVENous Q6H PRN    HYDROmorphone (PF) (DILAUDID) injection 1 mg  1 mg IntraVENous Q4H PRN    naloxone (NARCAN) injection 0.4 mg  0.4 mg IntraVENous PRN    ondansetron (ZOFRAN) injection 4 mg  4 mg IntraVENous Q4H PRN    heparin (porcine) injection 5,000 Units  5,000 Units SubCUTAneous Q12H    morphine injection 5 mg  5 mg IntraVENous Q4H PRN    oxyCODONE-acetaminophen (PERCOCET 10)  mg per tablet 1 Tab  1 Tab Oral Q4H PRN    sodium chloride (NS) flush 5-10 mL  5-10 mL IntraVENous Q8H    sodium chloride (NS) flush 5-10 mL  5-10 mL IntraVENous PRN    sodium chloride (NS) flush 5-10 mL  5-10 mL IntraVENous PRN    influenza vaccine 2016-17 (36mos+)(PF) (FLUZONE/FLUARIX/FLULAVAL QUAD) injection 0.5 mL  0.5 mL IntraMUSCular PRIOR TO DISCHARGE       Review of Systems  Constitutional: negative for fever, chills, sweats  Cardiovascular: negative for chest pain, palpitations, syncope, edema  Gastrointestinal:  negative for dysphagia, reflux, vomiting, diarrhea, abdominal pain, or melena  Neurologic:  negative for focal weakness, numbness, headache    Objective:     Vitals:    04/05/17 0737 04/05/17 0910 04/05/17 1114 04/05/17 1352   BP: 95/50 92/56 99/56 100/60   Pulse: 70 86 78 73   Resp: 20  16    Temp: 98.1 °F (36.7 °C)  98.8 °F (37.1 °C)    SpO2: 98%  95%      Intake and Output:   04/03 1901 - 04/05 0700  In: 3604 [P.O.:710; I.V.:900]  Out: 626 [Urine:600]  04/05 0701 - 04/05 1900  In: -   Out: 30     Physical Exam:   Constitution:  the patient is well developed and in no acute distress, room air  EENMT:  Sclera clear, pupils equal, oral mucosa moist  Respiratory: few basilar crackles, right side mild subc air, right chest tube to suction with no air leak  Cardiovascular:  RRR without M,G,R  Gastrointestinal: soft and non-tender; with positive bowel sounds. Musculoskeletal: warm without cyanosis. There is no lower leg edema. Skin:  no jaundice or rashes, surgical incision  Neurologic: no gross neuro deficits     Psychiatric:  alert and oriented x 3    CHEST XRAY 4/5/17:       LAB  No results for input(s): GLUCPOC in the last 72 hours. No lab exists for component: [place]   Recent Labs      04/05/17   0729  04/02/17   1445   WBC  10.7  6.4   HGB  12.7*  13.9   HCT  36.6*  41.6   PLT  210  182     Recent Labs      04/02/17   1445   NA  141   K  4.3   CL  103   CO2  30   GLU  98   BUN  15   CREA  1.01   CA  9.0   ALB  4.4   TBILI  0.2   ALT  34   SGOT  20     No results for input(s): PH, PCO2, PO2, HCO3 in the last 72 hours.   No results for input(s): LCAD, LAC in the last 72 hours.      Assessment:  (Medical Decision Making)     Hospital Problems  Date Reviewed: 4/5/2017          Codes Class Noted POA    S/P thoracotomy ICD-10-CM: Z98.890  ICD-9-CM: V45.89  4/5/2017 No     4/4/17  Right VATS procedure with right pleurodesis/Apical Bleb resection/Right Chest tube thoracostomy         Per surgery    * (Principal)Pneumothorax, right ICD-10-CM: J93.9  ICD-9-CM: 512.89  3/21/2017 Yes    Per surgery      Chest wall pain:  Post-surgical.      Plan:  (Medical Decision Making)     --NS 100ml/hr--can stop  --Surgery following    More than 50% of the time documented was spent in face-to-face contact with the patient and in the care of the patient on the floor/unit where the patient is located. Britney Blue NP     I have spoken with and examined the patient. I agree with the above assessment and plan as documented. He is still having pain, but toradol helped a lot. Gen:  Awake, alert, pleasant.     Lungs: CTA  Heart:  RRR with no Murmur/Rubs/Gallops  Ext: no c/c/e    --Start scheduled motrin  --Agree with stopping IVF  --Chest tube per surgery  --Bowel regimen    Karlene London MD

## 2017-04-05 NOTE — PROGRESS NOTES
TRANSFER - OUT REPORT:    Verbal report given to Ángel Salguero RN(name) on Job Locus  being transferred to room 831(unit) for routine post - op       Report consisted of patients Situation, Background, Assessment and   Recommendations(SBAR). Information from the following report(s) SBAR, Kardex, OR Summary, Intake/Output and MAR was reviewed with the receiving nurse. Lines:   Peripheral IV 04/02/17 Right Antecubital (Active)   Site Assessment Clean, dry, & intact 4/4/2017 10:17 PM   Phlebitis Assessment 0 4/4/2017 10:17 PM   Infiltration Assessment 0 4/4/2017 10:17 PM   Dressing Status Clean, dry, & intact 4/4/2017 10:17 PM   Dressing Type Transparent;Tape 4/4/2017 10:17 PM   Hub Color/Line Status Pink; Infusing 4/4/2017 10:17 PM        Opportunity for questions and clarification was provided. Patient transported with:   O2 @ 3 liters    VTE prophylaxis orders have been written for Job Locus. Patient and family given floor number and nurses name. Family updated re: pt status after security code verified.

## 2017-04-05 NOTE — PROGRESS NOTES
Mr. Yoli Toth is resting comfortably in bed, no s/s of distress. Chest tube continues to drain, dressing clean dry & intact. No further needs at this time.  SBAR bedside shift report will be given to oncoming RN

## 2017-04-05 NOTE — ANESTHESIA POSTPROCEDURE EVALUATION
Post-Anesthesia Evaluation and Assessment    Patient: Dolly Rivera MRN: [id number]  SSN: [id number]    YOB: 1975  Age: 40 y.o. Sex: male       Cardiovascular Function/Vital Signs  Visit Vitals    /75    Pulse (!) 105    Temp 36.6 °C (97.8 °F)    Resp 18    SpO2 99%       Patient is status post general anesthesia for Procedure(s):  RIGHT VIDEO ASSISTED THORASCOPIC SURGERY/ PLEURODESIS/ AND CHEST TUBE THORACOSTOMY . Nausea/Vomiting: None    Postoperative hydration reviewed and adequate. Pain:  Pain Scale 1: Numeric (0 - 10) (04/04/17 2118)  Pain Intensity 1: 7 (04/04/17 2118)   Managed    Neurological Status:   Neuro  Neurologic State: Alert (04/04/17 0715)  Orientation Level: Oriented X4 (04/04/17 0715)  Cognition: Follows commands (04/04/17 0715)  Speech: Clear (04/03/17 0939)   At baseline    Mental Status and Level of Consciousness: Arousable    Pulmonary Status:   O2 Device: Nasal cannula (04/04/17 2054)   Adequate oxygenation and airway patent    Complications related to anesthesia: None    Post-anesthesia assessment completed.  No concerns    Signed By: Rossy Lackey MD     April 4, 2017

## 2017-04-05 NOTE — PROGRESS NOTES
Postop visit by anesthesia. Pt sitting up eating lunch. No complaints of postop N/V. No severe pain at this time. No other issues at this time.

## 2017-04-05 NOTE — PROGRESS NOTES
Reassessment completed for Mr. Audrey Vinson. Resting in bed, no s/s of distress. Has tolerated clear liquids well, advancing diet towards regular. Chest tube draining, patent, dressing clean dry & intact. Call light in reach. Will monitor.

## 2017-04-05 NOTE — PROGRESS NOTES
Pt. sitting up in bed. Room air. Chest tube c/d/i. Set to 20cm of suction. No air leak present. Sanguinous drainage noted. No acute distress noted. Call light in reach. Instructed to call for assistance.

## 2017-04-05 NOTE — PROGRESS NOTES
PLAN:  Cont POC -- as per primary team  Cont CT to suction  Control pain PRN  Encourage IS  OOB to chair  VTE prophy      ASSESSMENT:  Admit Date: 4/2/2017   1 Day Post-Op  Procedure(s):  RIGHT VIDEO ASSISTED THORASCOPIC SURGERY/ PLEURODESIS/ AND CHEST TUBE THORACOSTOMY     Principal Problem:    Pneumothorax, right (3/21/2017)         SUBJECTIVE:  Sitting up in bed. Eating breakfast. Reports feels ok - pain controlled adequately. Reports CT site is sore. Feels hard to take deep breath but not SOB -- on RA. AF, VSS. Exam: Single view of the chest -- 4/5/17  INDICATION: Follow-up pneumothorax  COMPARISON: April 4, 2017  FINDINGS:  Right apical chest tube is in unchanged position. No definite evidence of  pneumothorax. The cardiac silhouette is stable. No evidence of developing  infiltrate or large effusion. IMPRESSION:  No significant interval change. OBJECTIVE:  Constitutional: Alert oriented cooperative patient in no acute distress; appears stated age   Visit Vitals    BP 95/50    Pulse 70    Temp 98.1 °F (36.7 °C)    Resp 20    SpO2 98%     Eyes: Sclera are clear. EOMs intact  ENMT: No external lesions, gross hearing normal; no obvious neck masses, no ear or lip lesions  CV: RRR, S1S2. Normal perfusion, pulses palpable, no edema  Resp: No JVD. Breathing is non-labored; no wheezing. BBS clear with diminished bases, on RA, effort shallow  GI: Soft and non-distended, non-tender, BS active     Musculoskeletal: Unremarkable with normal function. No embolic signs or cyanosis.    Neuro: Alert, oriented; moves all 4; no focal deficits  Psychiatric: Normal affect and mood, no memory impairment  Chest: CT patent, no AL, serosang drainage, 25cc output, dressing c/d/i      Patient Vitals for the past 24 hrs:   BP Temp Pulse Resp SpO2   04/05/17 0737 95/50 98.1 °F (36.7 °C) 70 20 98 %   04/05/17 0244 102/61 98.6 °F (37 °C) 89 20 95 %   04/04/17 2300 107/57 99.1 °F (37.3 °C) 78 18 94 %   04/04/17 2232 - - 75 13 98 % 04/04/17 2221 - - 75 12 98 %   04/04/17 2217 91/54 98.5 °F (36.9 °C) 73 14 98 %   04/04/17 2213 - - 72 12 98 %   04/04/17 2212 94/55 - 72 - 98 %   04/04/17 2207 97/52 - - - -   04/04/17 2206 - - 71 13 99 %   04/04/17 2203 - - 74 14 99 %   04/04/17 2202 92/52 - 70 - 98 %   04/04/17 2157 97/52 - 72 13 99 %   04/04/17 2152 92/55 - 71 13 98 %   04/04/17 2147 93/54 - 69 12 98 %   04/04/17 2142 98/56 - 70 12 99 %   04/04/17 2137 99/56 - 78 18 100 %   04/04/17 2133 - - 68 15 99 %   04/04/17 2132 97/56 - 66 - 99 %   04/04/17 2127 102/55 - 67 12 100 %   04/04/17 2122 107/60 - 65 17 99 %   04/04/17 2117 105/59 - 65 13 99 %   04/04/17 2112 97/52 - 62 28 99 %   04/04/17 2107 126/68 - 86 - 100 %   04/04/17 2102 122/63 - 87 - 99 %   04/04/17 2057 125/69 - 92 - 99 %   04/04/17 2054 136/75 97.8 °F (36.6 °C) (!) 105 18 99 %   04/04/17 2053 - - (!) 109 - 97 %   04/04/17 2052 130/76 - - - -   04/04/17 1746 104/63 98.2 °F (36.8 °C) 85 18 96 %   04/04/17 1557 111/60 98.8 °F (37.1 °C) 80 18 97 %   04/04/17 1145 103/61 97.1 °F (36.2 °C) 81 20 97 %       Labs:  Recent Labs      04/05/17   0729  04/02/17   1445   WBC  10.7  6.4   HGB  12.7*  13.9   PLT  210  182   NA   --   141   K   --   4.3   CL   --   103   CO2   --   30   BUN   --   15   CREA   --   1.01   GLU   --   98   TBILI   --   0.2   SGOT   --   20   ALT   --   34   AP   --   63         Eveline Henley, SAMANTA-C    I have seen and independently examined this patient in addition to the Nurse Practitioner and I formulated the assessment and plan and communicated the plan to her for the completion of the above note. The plan will include waiting for the pleurodesis to take affect. My usual time frame is 72 hours and if the CXR and chest tube does not show tidaling or bubbling I will remove it at that time. This will be Friday. We can discuss discharge at that time. I have discussed the plan with the patient and they are in agreement.   If they have any additional questions in the interim I have asked them to notify the nurse to call me. Mindy Flores MD, FACS  General and Bariatric Surgery  [facility].

## 2017-04-05 NOTE — PROGRESS NOTES
Admission assessment completed for Mr. Francisco Javier Eaton. Resting in bed, no s/s of distress. Alert and oriented x4. Complaints of slight R rib cage pain 2/10, no N/V. Resp even and unlabored, heart sounds normal. Skin warm dry & intact. R chest tube patent & draining, 66 output, suction to 20cm, no air leak. Peripheral IV clean dry & intact. Bed in low locked position; call light in reach. No further needs. Wife @ bedside. Will monitor.

## 2017-04-06 ENCOUNTER — APPOINTMENT (OUTPATIENT)
Dept: GENERAL RADIOLOGY | Age: 42
DRG: 168 | End: 2017-04-06
Attending: INTERNAL MEDICINE
Payer: COMMERCIAL

## 2017-04-06 PROBLEM — R07.89 CHEST WALL PAIN FOLLOWING SURGERY: Status: ACTIVE | Noted: 2017-04-06

## 2017-04-06 PROBLEM — G89.18 CHEST WALL PAIN FOLLOWING SURGERY: Status: ACTIVE | Noted: 2017-04-06

## 2017-04-06 LAB
ANION GAP BLD CALC-SCNC: 8 MMOL/L (ref 7–16)
BUN SERPL-MCNC: 14 MG/DL (ref 6–23)
CALCIUM SERPL-MCNC: 8.6 MG/DL (ref 8.3–10.4)
CHLORIDE SERPL-SCNC: 104 MMOL/L (ref 98–107)
CO2 SERPL-SCNC: 30 MMOL/L (ref 21–32)
CREAT SERPL-MCNC: 0.92 MG/DL (ref 0.8–1.5)
GLUCOSE SERPL-MCNC: 104 MG/DL (ref 65–100)
POTASSIUM SERPL-SCNC: 4.1 MMOL/L (ref 3.5–5.1)
SODIUM SERPL-SCNC: 142 MMOL/L (ref 136–145)

## 2017-04-06 PROCEDURE — 74011250637 HC RX REV CODE- 250/637: Performed by: INTERNAL MEDICINE

## 2017-04-06 PROCEDURE — 36415 COLL VENOUS BLD VENIPUNCTURE: CPT | Performed by: INTERNAL MEDICINE

## 2017-04-06 PROCEDURE — 71010 XR CHEST PORT: CPT

## 2017-04-06 PROCEDURE — 65270000029 HC RM PRIVATE

## 2017-04-06 PROCEDURE — 80048 BASIC METABOLIC PNL TOTAL CA: CPT | Performed by: INTERNAL MEDICINE

## 2017-04-06 PROCEDURE — 99232 SBSQ HOSP IP/OBS MODERATE 35: CPT | Performed by: INTERNAL MEDICINE

## 2017-04-06 PROCEDURE — 74011250636 HC RX REV CODE- 250/636: Performed by: SURGERY

## 2017-04-06 PROCEDURE — 94760 N-INVAS EAR/PLS OXIMETRY 1: CPT

## 2017-04-06 RX ADMIN — DOCUSATE SODIUM 100 MG: 100 CAPSULE, LIQUID FILLED ORAL at 09:05

## 2017-04-06 RX ADMIN — OXYCODONE HYDROCHLORIDE AND ACETAMINOPHEN 1 TABLET: 10; 325 TABLET ORAL at 05:38

## 2017-04-06 RX ADMIN — Medication 5 ML: at 22:28

## 2017-04-06 RX ADMIN — HEPARIN SODIUM 5000 UNITS: 5000 INJECTION, SOLUTION INTRAVENOUS; SUBCUTANEOUS at 12:22

## 2017-04-06 RX ADMIN — SENNOSIDES 17.2 MG: 8.6 TABLET, FILM COATED ORAL at 22:28

## 2017-04-06 RX ADMIN — PANTOPRAZOLE SODIUM 40 MG: 40 TABLET, DELAYED RELEASE ORAL at 05:38

## 2017-04-06 RX ADMIN — IBUPROFEN 600 MG: 200 TABLET, FILM COATED ORAL at 09:05

## 2017-04-06 RX ADMIN — OXYCODONE HYDROCHLORIDE AND ACETAMINOPHEN 1 TABLET: 10; 325 TABLET ORAL at 16:46

## 2017-04-06 RX ADMIN — HEPARIN SODIUM 5000 UNITS: 5000 INJECTION, SOLUTION INTRAVENOUS; SUBCUTANEOUS at 22:28

## 2017-04-06 RX ADMIN — DOCUSATE SODIUM 100 MG: 100 CAPSULE, LIQUID FILLED ORAL at 16:46

## 2017-04-06 RX ADMIN — Medication 5 ML: at 16:47

## 2017-04-06 RX ADMIN — Medication 10 ML: at 05:39

## 2017-04-06 RX ADMIN — IBUPROFEN 600 MG: 200 TABLET, FILM COATED ORAL at 22:27

## 2017-04-06 NOTE — PROGRESS NOTES
PLAN:  Cont POC -- as per primary team  Cont CT to suction - plan to place to waterseal at 2000  Follow CXRs  Control pain PRN  Encourage IS  OOB to chair  VTE prophy      ASSESSMENT:  Admit Date: 4/2/2017   2 Day Post-Op  Procedure(s):  RIGHT VIDEO ASSISTED THORASCOPIC SURGERY/ PLEURODESIS/ AND CHEST TUBE THORACOSTOMY     Principal Problem:    Pneumothorax, right (3/21/2017)    Active Problems:    S/P thoracotomy (4/5/2017)      Overview: 4/4/17  Right VATS procedure with right pleurodesis/Apical Bleb       resection/Right Chest tube thoracostomy      Chest wall pain following surgery (4/6/2017)         SUBJECTIVE:  Resting in bed. Reports he feels ok - pain controlled adequately. Reports CT site is sore. Feels hard to take deep breath but not SOB -- on RA. AF, VSS. Exam: Single view of the chest -- 4/6/17  INDICATION: Respiratory failure  COMPARISON: April 5, 2017  FINDINGS:  Right ankle chest tube remains in place. There is a small right apical  pneumothorax, slightly increased from comparison exam. The cardiac silhouette is  stable. Mild right right perihilar and lung base atelectasis. The left lung  remains clear. No large effusion. IMPRESSION:  Slight increase in small right apical pneumothorax, right chest tube remains in  place. OBJECTIVE:  Constitutional: Alert oriented cooperative patient in no acute distress; appears stated age   Visit Vitals    /65 (BP 1 Location: Left arm, BP Patient Position: Supine)    Pulse 80    Temp 98.6 °F (37 °C)    Resp 18    SpO2 96%     Eyes: Sclera are clear. EOMs intact  ENMT: No external lesions, gross hearing normal; no obvious neck masses, no ear or lip lesions  CV: RRR, S1S2. Normal perfusion, pulses palpable, no edema  Resp: No JVD. Breathing is non-labored; no wheezing. BBS clear with diminished bases, on RA, good effort   GI: Soft and non-distended, non-tender, BS active     Musculoskeletal: Unremarkable with normal function.  No embolic signs or cyanosis. Neuro: Alert, oriented; moves all 4; no focal deficits  Psychiatric: Normal affect and mood, no memory impairment  Chest: CT patent, no AL, serosang drainage, 52cc output, dressing c/d/i      Patient Vitals for the past 24 hrs:   BP Temp Pulse Resp SpO2   04/06/17 1110 - - - - 96 %   04/06/17 1045 105/65 98.6 °F (37 °C) 80 18 97 %   04/06/17 1005 101/63 - - - -   04/06/17 0758 93/52 - - - -   04/06/17 0730 (!) 84/45 98.2 °F (36.8 °C) 69 20 99 %   04/06/17 0321 91/52 99 °F (37.2 °C) 76 20 98 %   04/05/17 2314 93/56 98.8 °F (37.1 °C) 81 18 97 %   04/05/17 1943 97/59 98.2 °F (36.8 °C) 84 20 96 %       Labs:    Recent Labs      04/06/17   1121  04/05/17   0729   WBC   --   10.7   HGB   --   12.7*   PLT   --   210   NA  142   --    K  4.1   --    CL  104   --    CO2  30   --    BUN  14   --    CREA  0.92   --    GLU  104*   --          Les Hull NP-PHIL  I have seen and independently examined this patient in addition to the Nurse Practitioner and I formulated the assessment and plan and communicated the plan to her for the completion of the above note. The plan will include waterseal on the chest tube. Doing well. Slow progression. I have discussed the plan with the patient and they are in agreement. If they have any additional questions in the interim I have asked them to notify the nurse to call me. Ingris Elizabeth MD, FACS  General and Bariatric Surgery  [facility].

## 2017-04-06 NOTE — PROGRESS NOTES
Kerry Churchill  Admission Date: 4/2/2017             Daily Progress Note: 4/6/2017    The patient's chart is reviewed and the patient is discussed with the staff. 40 y.o. presents to [facility] where CXR confirmed right pneumothorax. He had an URI recently, was coughing at home when he felt a pop and knew his PTX had recurred. Was moved downtown for further treatment. He was just admitted here from 3/21-25 for a spontaneous right pneumothorax, a right Uresil was placed with initial complete evacuation of his PTX. The device was plugged and he had on a tiny, stable residual PTX. The uresil was pulled and CXR the next day showed no reaccumulation. Surgery was consulted and right VAT was performed 4/4. Subjective:     Sleeping, easily aroused and denies shortness of breath. Occasional productive cough.      Current Facility-Administered Medications   Medication Dose Route Frequency    ibuprofen (MOTRIN) tablet 600 mg  600 mg Oral TID    pantoprazole (PROTONIX) tablet 40 mg  40 mg Oral ACB    senna (SENOKOT) tablet 17.2 mg  2 Tab Oral QHS    docusate sodium (COLACE) capsule 100 mg  100 mg Oral BID    sodium chloride (NS) flush 5-10 mL  5-10 mL IntraVENous PRN    oxyCODONE-acetaminophen (PERCOCET) 5-325 mg per tablet 1 Tab  1 Tab Oral Q4H PRN    HYDROmorphone (PF) (DILAUDID) injection 1 mg  1 mg IntraVENous Q4H PRN    naloxone (NARCAN) injection 0.4 mg  0.4 mg IntraVENous PRN    ondansetron (ZOFRAN) injection 4 mg  4 mg IntraVENous Q4H PRN    heparin (porcine) injection 5,000 Units  5,000 Units SubCUTAneous Q12H    morphine injection 5 mg  5 mg IntraVENous Q4H PRN    oxyCODONE-acetaminophen (PERCOCET 10)  mg per tablet 1 Tab  1 Tab Oral Q4H PRN    sodium chloride (NS) flush 5-10 mL  5-10 mL IntraVENous Q8H    sodium chloride (NS) flush 5-10 mL  5-10 mL IntraVENous PRN    sodium chloride (NS) flush 5-10 mL  5-10 mL IntraVENous PRN    influenza vaccine 2016-17 (36mos+)(PF) (FLUZONE/FLUARIX/FLULAVAL QUAD) injection 0.5 mL  0.5 mL IntraMUSCular PRIOR TO DISCHARGE       Review of Systems  Constitutional: negative for fever, chills, sweats  Cardiovascular: negative for chest pain, palpitations, syncope, edema  Gastrointestinal:  negative for dysphagia, reflux, vomiting, diarrhea, abdominal pain, or melena  Neurologic:  negative for focal weakness, numbness, headache    Objective:     Vitals:    04/06/17 0758 04/06/17 1005 04/06/17 1045 04/06/17 1110   BP: 93/52 101/63 105/65    Pulse:   80    Resp:   18    Temp:   98.6 °F (37 °C)    SpO2:   97% 96%     Intake and Output:   04/04 1901 - 04/06 0700  In: 1680 [P.O.:780; I.V.:900]  Out: 1978 [Urine:1900]  04/06 0701 - 04/06 1900  In: 418 [P.O.:418]  Out: 304 [Urine:290]    Physical Exam:   Constitution:  the patient is well developed and in no acute distress, room air  EENMT:  Sclera clear, pupils equal, oral mucosa moist  Respiratory: few basilar crackles, right side mild subc air, right chest tube to suction with no air leak  Cardiovascular:  RRR without M,G,R  Gastrointestinal: soft and non-tender; with positive bowel sounds. Musculoskeletal: warm without cyanosis. There is no lower leg edema. Skin:  no jaundice or rashes, surgical incision  Neurologic: no gross neuro deficits     Psychiatric:  alert and oriented x 3    CXR 4/6/17:  Slight increase in small right apical pneumothorax, right chest tube remains in place      CHEST XRAY 4/5/17:       LAB  No results for input(s): GLUCPOC in the last 72 hours. No lab exists for component: GLPOC   Recent Labs      04/05/17   0729   WBC  10.7   HGB  12.7*   HCT  36.6*   PLT  210     Recent Labs      04/06/17   1121   NA  142   K  4.1   CL  104   CO2  30   GLU  104*   BUN  14   CREA  0.92   CA  8.6     No results for input(s): PH, PCO2, PO2, HCO3 in the last 72 hours. No results for input(s): LCAD, LAC in the last 72 hours.       Assessment:  (Medical Decision Making)     Hospital Problems Date Reviewed: 4/6/2017          Codes Class Noted POA    Chest wall pain following surgery ICD-10-CM: G89.12  ICD-9-CM: 338.12  4/6/2017 No    Fair control    S/P thoracotomy ICD-10-CM: Z98.890  ICD-9-CM: V45.89  4/5/2017 No     4/4/17  Right VATS procedure with right pleurodesis/Apical Bleb resection/Right Chest tube thoracostomy         Per surgery    * (Principal)Pneumothorax, right ICD-10-CM: J93.9  ICD-9-CM: 512.89  3/21/2017 Yes    Small reported on CXR            Plan:  (Medical Decision Making)     --Surgery following  --On scheduled Motrin  --CXR reviewed:  Slight increase in small right apical pneumothorax, right chest tube remains in place    More than 50% of the time documented was spent in face-to-face contact with the patient and in the care of the patient on the floor/unit where the patient is located. Richard Randle NP     Lungs: clear  Heart:  RRR with no Murmur/Rubs/Gallops    Additional Comments: Follow CXR and control pain. Per surgery    I have spoken with and examined the patient. I agree with the above assessment and plan as documented.     Castillo Galvan MD

## 2017-04-06 NOTE — PROGRESS NOTES
Shift assessment completed. Pt. Alert and oriented x4. O2 in place @ 2lnc. Right chest tube set to 20cm of suction. Continuous suction. No air leak noted. No crepitus noted today. Some discomfort when moving. No overt distress. Call light in reach. Instructed to call for assistance.

## 2017-04-06 NOTE — PROGRESS NOTES
Pt. resting quietly in bed. Room air. No acute distress noted. Right chest tube c/d/i. No air leak noted. Set to 20 cm of suction. Sanguinous fluid noted. Call light in reach. Instructed to call for assistance.

## 2017-04-06 NOTE — PROGRESS NOTES
Pt. resting quietly with eyes closed. No acute distress noted. Chest tube c/d/i. Room air. Call light in reach. Instructed to call for assistance.

## 2017-04-06 NOTE — PROGRESS NOTES
Uneventful shift. Pt resting quietly in bed. Resp even and unlabored. CT in place connected to suction. Call light within reach.

## 2017-04-06 NOTE — ADT AUTH CERT NOTES
Care Day: 4 Care Date: 4/5/2017 Level of Care: Inpatient Floor        Guideline Day 2        Level Of Care       (X) Floor              Clinical Status       (X) * Pain absent or managed       (X) * Respiratory distress absent       (X) * Chest tube air leak absent       ( ) * CXR shows resolution or improvement              Activity       ( ) * Activity as tolerated              Routes       (X) Oral hydration, medications       (X) Usual diet              Interventions       (X) Possible chest tube       (X) Possible oxygen              Medications       (X) Oral or parenteral pain medication                                   * Milestone              Additional Notes       CONT STAY REVIEW FOR 4/5/17       Pulmonary Disease Progress Notes       40 y.o. presents to [facility] where CXR confirmed right pneumothorax. He had an URI recently, was coughing at home when he felt a pop and knew his PTX had recurred. Was moved downtown for further treatment. Brenda Sumner was just admitted here from 3/21-25 for a spontaneous right pneumothorax, a right Uresil was placed with initial complete evacuation of his PTX. The device was plugged and he had on a tiny, stable residual PTX. The uresil was pulled and CXR the next day showed no reaccumulation.        Surgery was consulted and right VAT was performed 4/4.               Subjective:               Sitting up in bed, surgical soreness and breathing \"OK\" at rest. Rare cough.  Received IVF bolus for concern of hypotension--states he runs low BPs chronically and is asymptomatic.       Wife at the bedside.       Plan: (Medical Decision Making)               --NS 100ml/hr--can stop       --Surgery following        Gen: Awake, alert, pleasant.        Lungs: CTA       Heart: RRR with no Murmur/Rubs/Gallops       Ext: no c/c/e               --Start scheduled motrin       --Agree with stopping IVF       --Chest tube per surgery       --Bowel regimen       CXR- FINDINGS:Right apical chest tube is in unchanged position. No definite evidence of       pneumothorax. The cardiac silhouette is stable.  No evidence of developing       infiltrate or large effusion.         VSS, PAIN 6/10, CT, IS, REG DIET, SCDS, HEPARIN SQ, MOTRIN, PERCOCET, /HR, TORADOL IV

## 2017-04-06 NOTE — PROGRESS NOTES
Shift assessment complete. Pt resting quietly in bed. No signs of acute distress. Alert and oriented x4. R CT in place and draining. Resp even and unlabored. Call light within reach. Pt instructed to call for assistance. Will monitor.

## 2017-04-07 ENCOUNTER — APPOINTMENT (OUTPATIENT)
Dept: GENERAL RADIOLOGY | Age: 42
DRG: 168 | End: 2017-04-07
Attending: INTERNAL MEDICINE
Payer: COMMERCIAL

## 2017-04-07 PROCEDURE — 74011250637 HC RX REV CODE- 250/637: Performed by: INTERNAL MEDICINE

## 2017-04-07 PROCEDURE — 74011250636 HC RX REV CODE- 250/636: Performed by: SURGERY

## 2017-04-07 PROCEDURE — 74011250637 HC RX REV CODE- 250/637: Performed by: SURGERY

## 2017-04-07 PROCEDURE — 71010 XR CHEST PORT: CPT

## 2017-04-07 PROCEDURE — 99232 SBSQ HOSP IP/OBS MODERATE 35: CPT | Performed by: INTERNAL MEDICINE

## 2017-04-07 PROCEDURE — 65270000029 HC RM PRIVATE

## 2017-04-07 RX ORDER — OXYCODONE AND ACETAMINOPHEN 5; 325 MG/1; MG/1
1 TABLET ORAL
Status: DISCONTINUED | OUTPATIENT
Start: 2017-04-07 | End: 2017-04-08 | Stop reason: HOSPADM

## 2017-04-07 RX ADMIN — IBUPROFEN 600 MG: 200 TABLET, FILM COATED ORAL at 09:23

## 2017-04-07 RX ADMIN — DOCUSATE SODIUM 100 MG: 100 CAPSULE, LIQUID FILLED ORAL at 17:23

## 2017-04-07 RX ADMIN — IBUPROFEN 600 MG: 200 TABLET, FILM COATED ORAL at 17:23

## 2017-04-07 RX ADMIN — IBUPROFEN 600 MG: 200 TABLET, FILM COATED ORAL at 21:00

## 2017-04-07 RX ADMIN — Medication 10 ML: at 21:05

## 2017-04-07 RX ADMIN — HEPARIN SODIUM 5000 UNITS: 5000 INJECTION, SOLUTION INTRAVENOUS; SUBCUTANEOUS at 21:05

## 2017-04-07 RX ADMIN — DOCUSATE SODIUM 100 MG: 100 CAPSULE, LIQUID FILLED ORAL at 09:23

## 2017-04-07 RX ADMIN — OXYCODONE HYDROCHLORIDE AND ACETAMINOPHEN 1 TABLET: 5; 325 TABLET ORAL at 00:19

## 2017-04-07 RX ADMIN — Medication 10 ML: at 13:19

## 2017-04-07 RX ADMIN — PANTOPRAZOLE SODIUM 40 MG: 40 TABLET, DELAYED RELEASE ORAL at 05:19

## 2017-04-07 RX ADMIN — HEPARIN SODIUM 5000 UNITS: 5000 INJECTION, SOLUTION INTRAVENOUS; SUBCUTANEOUS at 11:20

## 2017-04-07 RX ADMIN — OXYCODONE HYDROCHLORIDE AND ACETAMINOPHEN 1 TABLET: 5; 325 TABLET ORAL at 09:22

## 2017-04-07 RX ADMIN — Medication 5 ML: at 05:20

## 2017-04-07 NOTE — PROGRESS NOTES
Kishor Lorenzo  Admission Date: 4/2/2017             Daily Progress Note: 4/7/2017    The patient's chart is reviewed and the patient is discussed with the staff. 40 y.o. presents to [facility] where CXR confirmed right pneumothorax. He had an URI recently, was coughing at home when he felt a pop and knew his PTX had recurred. Was moved downtown for further treatment. He was just admitted here from 3/21-25 for a spontaneous right pneumothorax, a right Uresil was placed with initial complete evacuation of his PTX. The device was plugged and he had on a tiny, stable residual PTX. The uresil was pulled and CXR the next day showed no reaccumulation. Surgery was consulted and right VAT was performed 4/4. Subjective:     Sleeping, easily aroused and denies shortness of breath. Occasional productive cough. Chest tube to water seal. Wife at the bedside.     Current Facility-Administered Medications   Medication Dose Route Frequency    ibuprofen (MOTRIN) tablet 600 mg  600 mg Oral TID    pantoprazole (PROTONIX) tablet 40 mg  40 mg Oral ACB    senna (SENOKOT) tablet 17.2 mg  2 Tab Oral QHS    docusate sodium (COLACE) capsule 100 mg  100 mg Oral BID    sodium chloride (NS) flush 5-10 mL  5-10 mL IntraVENous PRN    oxyCODONE-acetaminophen (PERCOCET) 5-325 mg per tablet 1 Tab  1 Tab Oral Q4H PRN    HYDROmorphone (PF) (DILAUDID) injection 1 mg  1 mg IntraVENous Q4H PRN    naloxone (NARCAN) injection 0.4 mg  0.4 mg IntraVENous PRN    ondansetron (ZOFRAN) injection 4 mg  4 mg IntraVENous Q4H PRN    heparin (porcine) injection 5,000 Units  5,000 Units SubCUTAneous Q12H    morphine injection 5 mg  5 mg IntraVENous Q4H PRN    oxyCODONE-acetaminophen (PERCOCET 10)  mg per tablet 1 Tab  1 Tab Oral Q4H PRN    sodium chloride (NS) flush 5-10 mL  5-10 mL IntraVENous Q8H    sodium chloride (NS) flush 5-10 mL  5-10 mL IntraVENous PRN    sodium chloride (NS) flush 5-10 mL  5-10 mL IntraVENous PRN  influenza vaccine 2016-17 (36mos+)(PF) (FLUZONE/FLUARIX/FLULAVAL QUAD) injection 0.5 mL  0.5 mL IntraMUSCular PRIOR TO DISCHARGE       Review of Systems  Constitutional: negative for fever, chills, sweats  Cardiovascular: negative for chest pain, palpitations, syncope, edema  Gastrointestinal:  negative for dysphagia, reflux, vomiting, diarrhea, abdominal pain, or melena  Neurologic:  negative for focal weakness, numbness, headache    Objective:     Vitals:    04/07/17 0404 04/07/17 0723 04/07/17 0812 04/07/17 1028   BP: 100/62 94/58 102/64 107/65   Pulse:  79  84   Resp:  18  18   Temp:  97.6 °F (36.4 °C)  98.6 °F (37 °C)   SpO2:  96%  97%     Intake and Output:   04/05 1901 - 04/07 0700  In: 1138 [P.O.:1138]  Out: 2686 [Urine:2630]  04/07 0701 - 04/07 1900  In: 300 [P.O.:300]  Out: 500 [Urine:500]    Physical Exam:   Constitution:  the patient is well developed and in no acute distress, room air  EENMT:  Sclera clear, pupils equal, oral mucosa moist  Respiratory: few right base crackles with diminished breath sounds, right chest tube to water seal with no air leak  Cardiovascular:  RRR without M,G,R  Gastrointestinal: soft and non-tender; with positive bowel sounds. Musculoskeletal: warm without cyanosis. There is no lower leg edema. Skin:  no jaundice or rashes, surgical incision  Neurologic: no gross neuro deficits     Psychiatric:  alert and oriented x 3    CXR 4/7/17:  Stable small right pneumothorax      CXR 4/6/17:  Slight increase in small right apical pneumothorax, right chest tube remains in place        LAB  No results for input(s): GLUCPOC in the last 72 hours. No lab exists for component: GLPOC   Recent Labs      04/05/17   0729   WBC  10.7   HGB  12.7*   HCT  36.6*   PLT  210     Recent Labs      04/06/17   1121   NA  142   K  4.1   CL  104   CO2  30   GLU  104*   BUN  14   CREA  0.92   CA  8.6     No results for input(s): PH, PCO2, PO2, HCO3 in the last 72 hours.   No results for input(s): LCAD, LAC in the last 72 hours. Assessment:  (Medical Decision Making)     Hospital Problems  Date Reviewed: 4/7/2017          Codes Class Noted POA    Chest wall pain following surgery ICD-10-CM: G89.12  ICD-9-CM: 338.12  4/6/2017 No    Fair control    S/P thoracotomy ICD-10-CM: Z98.890  ICD-9-CM: V45.89  4/5/2017 No     4/4/17  Right VATS procedure with right pleurodesis/Apical Bleb resection/Right Chest tube thoracostomy         Per surgery    * (Principal)Pneumothorax, right ICD-10-CM: J93.9  ICD-9-CM: 512.89  3/21/2017 Yes    Small reported on CXR          Plan:  (Medical Decision Making)     --Surgery following--planning chest tube tomorrow and possible discharge  --On scheduled Motrin  --CXR reviewed:  Slight increase in small right apical pneumothorax, right chest tube remains in place    More than 50% of the time documented was spent in face-to-face contact with the patient and in the care of the patient on the floor/unit where the patient is located. Martin Lemos NP     Lungs:  clear  Heart:  RRR with no Murmur/Rubs/Gallops    Additional Comments:   Hope to remove CT in am. Cont. Current pain meds    I have spoken with and examined the patient. I agree with the above assessment and plan as documented.     Emily Martinez MD

## 2017-04-07 NOTE — PROGRESS NOTES
Percocet 5 mg given PO for pt c/o right ribcage pain 6/10. Temp 100.0 F. Patient encouraged to used incentive spirometer while awkak at night and during the day.

## 2017-04-07 NOTE — PROGRESS NOTES
Shift assessment completed. Patient alert and oriented x 4 with flat affect. Chest tube dressing CDI, water seal intact with 20 cm water suction. Pt. On RA. Respirations even and unlabored. No acute distress noted. Bed low, locked, call bell within reach. Side rails up x 2. No complaints voiced at this time.

## 2017-04-07 NOTE — PROGRESS NOTES
PLAN:  Cont POC -- as per primary team  Cont CT to waterseal   DC CT in am  Follow CXRs  Control pain PRN  Encourage IS  OOB to chair  VTE prophy  Possibly DC home tomorrow    ASSESSMENT:  Admit Date: 4/2/2017   3 Days Post-Op  Procedure(s):  RIGHT VIDEO ASSISTED THORASCOPIC SURGERY/ PLEURODESIS/ AND CHEST TUBE THORACOSTOMY     Principal Problem:    Pneumothorax, right (3/21/2017)    Active Problems:    S/P thoracotomy (4/5/2017)      Overview: 4/4/17  Right VATS procedure with right pleurodesis/Apical Bleb       resection/Right Chest tube thoracostomy      Chest wall pain following surgery (4/6/2017)       SUBJECTIVE:  Awake in bed. No complaints. On RA - no SOB. AF, VSS. OBJECTIVE:  Constitutional: Alert, cooperative, no acute distress; appears stated age   Visit Vitals    /65 (BP 1 Location: Left arm, BP Patient Position: Supine)    Pulse 84    Temp 98.6 °F (37 °C)    Resp 18    SpO2 97%     Eyes: Sclera are clear. EOMs intact  ENMT: No external lesions, gross hearing normal; no obvious neck masses, no ear or lip lesions  CV: RRR, S1S2. Normal perfusion, pulses palpable, no edema  Resp: No JVD. Breathing is non-labored; no wheezing, on RA, good effort   GI: Soft and non-distended, non-tender, BS active   Musculoskeletal: Unremarkable with normal function. No embolic signs or cyanosis.    Neuro: Alert, oriented; moves all 4; no focal deficits  Psychiatric: Normal affect and mood, no memory impairment  Chest: CT patent, no AL, serosang drainage, 46cc output, dressing c/d/i    Patient Vitals for the past 24 hrs:   BP Temp Pulse Resp SpO2   04/07/17 1028 107/65 98.6 °F (37 °C) 84 18 97 %   04/07/17 0812 102/64 - - - -   04/07/17 0723 94/58 97.6 °F (36.4 °C) 79 18 96 %   04/07/17 0404 100/62 - - - -   04/07/17 0354 99/55 98.7 °F (37.1 °C) 83 20 97 %   04/06/17 2357 100/58 100 °F (37.8 °C) 81 18 95 %   04/06/17 2000 106/58 99.3 °F (37.4 °C) 83 20 98 %   04/06/17 1756 100/63 - - - -   04/06/17 1500 99/67 98.6 °F (37 °C) 88 18 96 %     Labs:  Recent Labs      04/06/17   1121  04/05/17   0729   WBC   --   10.7   HGB   --   12.7*   PLT   --   210   NA  142   --    K  4.1   --    CL  104   --    CO2  30   --    BUN  14   --    CREA  0.92   --    GLU  104*   --      4/7/17 Chest portable     CLINICAL INDICATION: Pancreatic mass, follow-up pneumothorax, chest tube     COMPARISON: Yesterday     TECHNIQUE: single AP portable view chest at 6:22 AM upright      FINDINGS: The small right apical pneumothorax is not significantly changed. The  right chest tube remains. There is no mediastinal or hilar cyst. There is no  evidence of consolidation, increasing or new pneumothorax, pleural effusion or  pulmonary edema. The mediastinal and hilar contours are normal given technique.        IMPRESSION: Stable small right pneumothorax. Erika Hansen, FNP-BC    The patient was seen in conjunction with Dr. Mariluz Little who independently evaluated the patient, reviewed the chart and agreed with the assessment and plan. He is recovering well. There is not evidence of leak from the chest tube. It can be discontinued and discharge planned if the follow up cxr is unchanged.

## 2017-04-08 ENCOUNTER — APPOINTMENT (OUTPATIENT)
Dept: GENERAL RADIOLOGY | Age: 42
DRG: 168 | End: 2017-04-08
Attending: INTERNAL MEDICINE
Payer: COMMERCIAL

## 2017-04-08 ENCOUNTER — APPOINTMENT (OUTPATIENT)
Dept: GENERAL RADIOLOGY | Age: 42
DRG: 168 | End: 2017-04-08
Attending: NURSE PRACTITIONER
Payer: COMMERCIAL

## 2017-04-08 VITALS
DIASTOLIC BLOOD PRESSURE: 41 MMHG | RESPIRATION RATE: 18 BRPM | TEMPERATURE: 97.9 F | HEART RATE: 75 BPM | SYSTOLIC BLOOD PRESSURE: 91 MMHG | OXYGEN SATURATION: 95 %

## 2017-04-08 PROCEDURE — 74011250636 HC RX REV CODE- 250/636: Performed by: SURGERY

## 2017-04-08 PROCEDURE — 71010 XR CHEST PORT: CPT

## 2017-04-08 PROCEDURE — 74011250637 HC RX REV CODE- 250/637: Performed by: INTERNAL MEDICINE

## 2017-04-08 PROCEDURE — 99232 SBSQ HOSP IP/OBS MODERATE 35: CPT | Performed by: INTERNAL MEDICINE

## 2017-04-08 RX ORDER — OXYCODONE AND ACETAMINOPHEN 7.5; 325 MG/1; MG/1
1 TABLET ORAL
Qty: 40 TAB | Refills: 0 | Status: SHIPPED
Start: 2017-04-08 | End: 2017-10-05

## 2017-04-08 RX ADMIN — PANTOPRAZOLE SODIUM 40 MG: 40 TABLET, DELAYED RELEASE ORAL at 07:00

## 2017-04-08 RX ADMIN — Medication 10 ML: at 06:06

## 2017-04-08 RX ADMIN — Medication 10 ML: at 13:29

## 2017-04-08 RX ADMIN — HEPARIN SODIUM 5000 UNITS: 5000 INJECTION, SOLUTION INTRAVENOUS; SUBCUTANEOUS at 11:00

## 2017-04-08 RX ADMIN — DOCUSATE SODIUM 100 MG: 100 CAPSULE, LIQUID FILLED ORAL at 08:12

## 2017-04-08 RX ADMIN — IBUPROFEN 600 MG: 200 TABLET, FILM COATED ORAL at 08:12

## 2017-04-08 NOTE — PROGRESS NOTES
Discharged instructions and prescriptions given. Pt verbalized understanding. Denies pain or shortness of breath. Pt discharged via wheelchair to home accompanied by staff.

## 2017-04-08 NOTE — PROGRESS NOTES
PLAN:  Cont POC -- as per primary team  DC CT   Follow CXRs  Control pain PRN  Encourage IS  OOB to chair  VTE prophy  Possibly DC home later tomorrow if agreeable with primary team    ASSESSMENT:  Admit Date: 4/2/2017   4 Days Post-Op  Procedure(s):  RIGHT VIDEO ASSISTED THORASCOPIC SURGERY/ PLEURODESIS/ AND CHEST TUBE THORACOSTOMY     Principal Problem:    Pneumothorax, right (3/21/2017)    Active Problems:    S/P thoracotomy (4/5/2017)      Overview: 4/4/17  Right VATS procedure with right pleurodesis/Apical Bleb       resection/Right Chest tube thoracostomy      Chest wall pain following surgery (4/6/2017)         SUBJECTIVE:  Awake in bed. No new complaints. On RA - no SOB. Right CT in place - no air leak. No output documented over last 24 hours. AF, VSS. OBJECTIVE:  Constitutional: Alert, cooperative, no acute distress; appears stated age   Visit Vitals    /64    Pulse 73    Temp 98.5 °F (36.9 °C)    Resp 20    SpO2 97%     Eyes: Sclera are clear. EOMs intact  ENMT: No external lesions, gross hearing normal; no obvious neck masses, no ear or lip lesions  CV: RRR, S1S2. Normal perfusion, pulses palpable, no edema  Resp: No JVD. Breathing is non-labored; no wheezing, on RA, good effort   GI: Soft and non-distended, non-tender, BS active   Musculoskeletal: Unremarkable with normal function. No embolic signs or cyanosis.    Neuro: Alert, oriented; moves all 4; no focal deficits  Psychiatric: Normal affect and mood, no memory impairment  Chest: CT patent, no AL, serosang drainage, No output last 24 hours, dressing c/d/i     Patient Vitals for the past 24 hrs:   BP Temp Pulse Resp SpO2   04/08/17 0710 103/64 98.5 °F (36.9 °C) 73 20 97 %   04/08/17 0006 104/61 98.2 °F (36.8 °C) 69 18 98 %   04/07/17 1858 98/56 98.7 °F (37.1 °C) 76 18 96 %   04/07/17 1415 93/54 98.1 °F (36.7 °C) 75 20 97 %   04/07/17 1028 107/65 98.6 °F (37 °C) 84 18 97 %     Labs:  Recent Labs      04/06/17   1121   NA  142   K  4.1   CL 104   CO2  30   BUN  14   CREA  0.92   GLU  104*     4/8/17 Portable chest xray      COMPARISON: 4/7/2017     CLINICAL HISTORY: Respiratory failure.     FINDINGS:     There is a stable right-sided chest tube. There is stable tiny right apical  pneumothorax. No focal consolidation, pleural effusion. No pulmonary edema. Cardiac mediastinal contour is within normal limits    IMPRESSION:     Stable right-sided chest tube and tiny right apical pneumothorax. Favian Trent, FNP-BC    The patient was seen in conjunction with Dr. Law Hagan who independently evaluated the patient, reviewed the chart and agreed with the assessment and plan.

## 2017-04-08 NOTE — DISCHARGE SUMMARY
Physician Discharge Summary     Patient ID:  Mark Salguero  [id number]  40 y.o.  1975    Admit Date: 4/2/2017     HPI:42 y.o. presented to [facility] where CXR confirmed right pneumothorax. He had an URI recently, was coughing at home when he felt a pop and knew his PTX had recurred. Was moved downtown for further treatment. He was just admitted here from 3/21-25 for a spontaneous right pneumothorax, a right Uresil was placed with initial complete evacuation of his PTX. The device was plugged and he had on a tiny, stable residual PTX. The uresil was pulled and CXR the next day showed no reaccumulation. Surgery was consulted and right VAT was performed 4/4. Discharge Date: 4/8/2017    * Admission Diagnoses: Spontaneous tension pneumothorax [J93.0]  Primary spontaneous pneumothorax [J93.11]    * Discharge Diagnoses:    Hospital Problems as of 4/8/2017  Date Reviewed: 4/7/2017          Codes Class Noted - Resolved POA    Chest wall pain following surgery ICD-10-CM: G89.12  ICD-9-CM: 338.12  4/6/2017 - Present No        S/P thoracotomy ICD-10-CM: Z98.890  ICD-9-CM: V45.89  4/5/2017 - Present No    Overview Signed 4/5/2017  2:26 PM by Jose Rausch NP     4/4/17  Right VATS procedure with right pleurodesis/Apical Bleb resection/Right Chest tube thoracostomy             Pneumothorax on right ICD-10-CM: J93.9  ICD-9-CM: 512.89  4/2/2017 - Present         * (Principal)Pneumothorax, right ICD-10-CM: J93.9  ICD-9-CM: 512.89  3/21/2017 - Present Yes               Admission Condition: Fair    * Discharge Condition: good and improved    * Procedures: Procedure(s):  RIGHT VIDEO ASSISTED THORASCOPIC SURGERY/ PLEURODESIS/ AND CHEST TUBE THORACOSTOMY     * Hospital Course:   Normal hospital course for this procedure.     Consults: None    Significant Diagnostic Studies: labs and radiology    * Disposition: Home    Discharge Medications:   Current Discharge Medication List      START taking these medications    Details oxyCODONE-acetaminophen (PERCOCET) 7.5-325 mg per tablet Take 1 Tab by mouth every four (4) hours as needed for Pain. Max Daily Amount: 6 Tabs. Qty: 40 Tab, Refills: 0         STOP taking these medications       HYDROcodone-acetaminophen (NORCO) 7.5-325 mg per tablet Comments:   Reason for Stopping:               * Follow-up Care/Patient Instructions: Activity: Activity as tolerated  Diet: Regular Diet  Wound Care: Keep wound clean and dry    Follow-up Information     Follow up With Details Comments Contact Info    [facility] Go on 4/10/2017 Appointment at 3:00 PM for post-hospitalization follow-up and establish PCP relationship. Appointment is with Dr. Shadi Pan. [address] [place] [id number]  [address] [place] [address]  [id number] [phone]          Follow-up with Dr. Tuan Rich in 10 days. Signed:  RUDDY Wolf-BC  4/8/2017  1:42 PM    OK to DC.   Megan Mcconnell MD

## 2017-04-08 NOTE — PROGRESS NOTES
Pt is resting quietly in bed. Respirations are even and unlabored. Pt has right chest tube to gravity. No signs or symptoms of distress are noted. No SOB or pain is reported at this time. Call light is within reach. Will continue to monitor.

## 2017-04-08 NOTE — DISCHARGE INSTRUCTIONS
DISCHARGE SUMMARY from Nurse    The following personal items are in your possession at time of discharge:    Dental Appliances: None  Visual Aid: Contacts, Glasses     Home Medications: Sent home  Jewelry: Ring  Clothing: Footwear, Pants, Shirt, Undergarments, With patient  Other Valuables: Cell Phone  Personal Items Sent to Safe: no          PATIENT INSTRUCTIONS:    After general anesthesia or intravenous sedation, for 24 hours or while taking prescription Narcotics:  · Limit your activities  · Do not drive and operate hazardous machinery  · Do not make important personal or business decisions  · Do  not drink alcoholic beverages  · If you have not urinated within 8 hours after discharge, please contact your surgeon on call. Report the following to your surgeon:  · Excessive pain, swelling, redness or odor of or around the surgical area  · Temperature over 100.5  · Nausea and vomiting lasting longer than 4 hours or if unable to take medications  · Any signs of decreased circulation or nerve impairment to extremity: change in color, persistent  numbness, tingling, coldness or increase pain  · Any questions        What to do at Home:  Recommended activity: Activity as tolerated. If you experience any of the following symptoms chest pain, shortness of breath, or fever. Please follow up with primary care physician. *  Please give a list of your current medications to your Primary Care Provider. *  Please update this list whenever your medications are discontinued, doses are      changed, or new medications (including over-the-counter products) are added. *  Please carry medication information at all times in case of emergency situations. These are general instructions for a healthy lifestyle:    No smoking/ No tobacco products/ Avoid exposure to second hand smoke    Surgeon General's Warning:  Quitting smoking now greatly reduces serious risk to your health.     Obesity, smoking, and sedentary lifestyle greatly increases your risk for illness    A healthy diet, regular physical exercise & weight monitoring are important for maintaining a healthy lifestyle    You may be retaining fluid if you have a history of heart failure or if you experience any of the following symptoms:  Weight gain of 3 pounds or more overnight or 5 pounds in a week, increased swelling in our hands or feet or shortness of breath while lying flat in bed. Please call your doctor as soon as you notice any of these symptoms; do not wait until your next office visit. Recognize signs and symptoms of STROKE:    F-face looks uneven    A-arms unable to move or move unevenly    S-speech slurred or non-existent    T-time-call 911 as soon as signs and symptoms begin-DO NOT go       Back to bed or wait to see if you get better-TIME IS BRAIN. Warning Signs of HEART ATTACK     Call 911 if you have these symptoms:   Chest discomfort. Most heart attacks involve discomfort in the center of the chest that lasts more than a few minutes, or that goes away and comes back. It can feel like uncomfortable pressure, squeezing, fullness, or pain.  Discomfort in other areas of the upper body. Symptoms can include pain or discomfort in one or both arms, the back, neck, jaw, or stomach.  Shortness of breath with or without chest discomfort.  Other signs may include breaking out in a cold sweat, nausea, or lightheadedness. Don't wait more than five minutes to call 911 - MINUTES MATTER! Fast action can save your life. Calling 911 is almost always the fastest way to get lifesaving treatment. Emergency Medical Services staff can begin treatment when they arrive -- up to an hour sooner than if someone gets to the hospital by car. The discharge information has been reviewed with the patient. The patient verbalized understanding.     Discharge medications reviewed with the patient and appropriate educational materials and side effects teaching were provided. Collapsed Lung: Care Instructions  Your Care Instructions    A collapsed lung (pneumothorax) is a buildup of air in the space between the lung and the chest wall. As more air builds up in this space, the pressure against the lung makes the lung collapse. This causes shortness of breath and chest pain because your lung cannot fully expand. A collapsed lung is usually caused by an injury to the chest, but it may also occur suddenly without an injury because of a lung illness, such as emphysema or lung fibrosis. Your lung may collapse after lung surgery or another medical procedure. Sometimes it happens for no known reason in an otherwise healthy person (spontaneous pneumothorax). Treatment depends on the cause of the collapse. It may heal with rest, although your doctor will want to keep track of your progress. It can take several days for the lung to expand again. Your doctor may have drained the air with a needle or tube inserted into the space between your chest and the collapsed lung. If you have a chest tube, be sure to follow your doctor's instructions about how to care for the tube. You may need further treatment if you are not getting better. Surgery is sometimes needed to keep the lung inflated. The doctor will want to keep track of your progress, so you will need a follow-up exam within a few days. The doctor has checked you carefully, but problems can develop later. If you notice any problems or new symptoms, get medical treatment right away. Follow-up care is a key part of your treatment and safety. Be sure to make and go to all appointments, and call your doctor if you are having problems. It's also a good idea to know your test results and keep a list of the medicines you take. How can you care for yourself at home? · Get plenty of rest and sleep. You may feel weak and tired for a while, but your energy level will improve with time.   · Hold a pillow against your chest when you cough or take deep breaths. This will support your chest and decrease your pain. · Take pain medicines exactly as directed. ¨ If the doctor gave you a prescription medicine for pain, take it as prescribed. ¨ If you are not taking a prescription pain medicine, ask your doctor if you can take an over-the-counter medicine. · If your doctor prescribed antibiotics, take them as directed. Do not stop taking them just because you feel better. You need to take the full course of antibiotics. · If you have a bandage over your chest tube, or the place where the chest tube was inserted, keep it clean and dry. Follow your doctor's instructions on bandage care. · If you go home with a tube in place, follow the doctor's directions. Do not adjust the tube in any way. This could break the seal or cause other problems. Keep the tube dry. · Avoid any movements that require your muscles, especially your chest muscles, to strain. Such movements include laughing hard, bearing down to have a bowel movement, and heavy lifting. Try not to cough. · Do not fly in an airplane until your doctor tells you it is okay. Avoid any situations where there is increased air pressure. · Do not smoke or allow others to smoke around you. If you need help quitting, talk to your doctor about stop-smoking programs and medicines. These can increase your chances of quitting for good. When should you call for help? Call 911 anytime you think you may need emergency care. For example, call if:  · You have severe trouble breathing. · You passed out (lost consciousness). Call your doctor now or seek immediate medical care if:  · You have new or worse trouble breathing. · You have new pain or your pain gets worse. · You have a fever. · You cough up blood. · Your chest tube starts to come out or falls out. · You are bleeding through the bandage where the tube was put in.   Watch closely for changes in your health, and be sure to contact your doctor if:  · The skin around the place where the chest tube was put in is red or irritated. · You do not get better as expected. Where can you learn more? Go to http://mirna-melissa.info/. Enter X051 in the search box to learn more about \"Collapsed Lung: Care Instructions. \"  Current as of: May 23, 2016  Content Version: 11.2  © 4818-8021 [employer]. Care instructions adapted under license by [employer] (which disclaims liability or warranty for this information). If you have questions about a medical condition or this instruction, always ask your healthcare professional. [place] [address] any warranty or liability for your use of this information. Chest Tube: What to Expect at Home  Your Recovery  A chest tube is placed through the chest wall between two ribs. You had a chest tube put in to help your collapsed lung expand. The tube was removed before you came home. You may have some pain in your chest from the cut (incision) where the tube was put in. For most people, the pain goes away after about 2 weeks. You will have a bandage taped over the wound. Your doctor will remove the bandage and examine the wound in about 2 days. It will take about 3 to 4 weeks for your incision to heal completely. It may leave a small scar that will fade with time. This care sheet gives you a general idea about how long it will take for you to recover. But each person recovers at a different pace. Follow the steps below to feel better as quickly as possible. How can you care for yourself at home? Activity  · Rest when you feel tired. Getting enough sleep will help you recover. · Try to walk each day. Start by walking a little more than you did the day before. Bit by bit, increase the amount you walk. Walking boosts blood flow and helps prevent pneumonia and constipation.   · Avoid strenuous activities, such as bicycle riding, jogging, weight lifting, or aerobic exercise, until your doctor says it is okay. · How soon you can return to work or your normal routine depends on what health problems you have. Talk with your doctor about how long it will take you to recover. · You may shower after your bandage is removed. Pat the cut (incision) dry. Do not take a bath for 2 weeks after your chest tube is out, or until your doctor tells you it is okay. · Practice deep breathing exercises as directed by your doctor. Coughing exercises also can help drain fluid out of your chest.  Diet  · You can eat your normal diet. If your stomach is upset, try bland, low-fat foods like plain rice, broiled chicken, toast, and yogurt. · Drink plenty of fluids (unless your doctor tells you not to). Medicines  · Your doctor will tell you if and when you can restart your medicines. He or she will also give you instructions about taking any new medicines. · If you take blood thinners, such as warfarin (Coumadin), clopidogrel (Plavix), or aspirin, be sure to talk to your doctor. He or she will tell you if and when to start taking those medicines again. Make sure that you understand exactly what your doctor wants you to do. · Be safe with medicines. Take pain medicines exactly as directed. ¨ If the doctor gave you a prescription medicine for pain, take it as prescribed. ¨ If you are not taking a prescription pain medicine, ask your doctor if you can take an over-the-counter medicine. · Take your antibiotics as directed. Do not stop taking them just because you feel better. You need to take the full course of antibiotics. Incision care  · Keep the incision dry as it heals. You will have a bandage over it to help the incision heal and protect it. Your doctor will tell you how to take care of this. Other instructions  · Do not smoke. Smoking makes lung problems worse. If you need help quitting, talk to your doctor about stop-smoking programs and medicines.  These can increase your chances of quitting for good. Follow-up care is a key part of your treatment and safety. Be sure to make and go to all appointments, and call your doctor if you are having problems. It's also a good idea to know your test results and keep a list of the medicines you take. When should you call for help? Call 911 anytime you think you may need emergency care. For example, call if:  · You passed out (lost consciousness). · You have severe trouble breathing. · You have sudden chest pain and shortness of breath, or you cough up blood. Call your doctor now or seek immediate medical care if:  · You continue to have trouble breathing. · Your shortness of breath is getting worse. · Bright red blood soaks through the bandage over your incision. · You have a fever. Watch closely for any changes in your health, and be sure to contact your doctor if:  · You do not get better as expected. Where can you learn more? Go to http://mirna-melissa.info/. Enter O335 in the search box to learn more about \"Chest Tube: What to Expect at Home. \"  Current as of: May 23, 2016  Content Version: 11.2  © 4155-1744 [employer]. Care instructions adapted under license by [employer] (which disclaims liability or warranty for this information). If you have questions about a medical condition or this instruction, always ask your healthcare professional. [address] any warranty or liability for your use of this information.

## 2017-04-08 NOTE — PROGRESS NOTES
Jazmin Floyd  Admission Date: 4/2/2017             Daily Progress Note: 4/8/2017    The patient's chart is reviewed and the patient is discussed with the staff. 40 y.o. presents to [facility] where CXR confirmed right pneumothorax. He had an URI recently, was coughing at home when he felt a pop and knew his PTX had recurred. Was moved downtown for further treatment. He was just admitted here from 3/21-25 for a spontaneous right pneumothorax, a right Uresil was placed with initial complete evacuation of his PTX. The device was plugged and he had on a tiny, stable residual PTX. The uresil was pulled and CXR the next day showed no reaccumulation. Surgery was consulted and right VAT was performed 4/4. Subjective:      He  denies shortness of breath. Occasional productive cough.    Chest tube is pulled out by surgery    Current Facility-Administered Medications   Medication Dose Route Frequency    oxyCODONE-acetaminophen (PERCOCET) 5-325 mg per tablet 1 Tab  1 Tab Oral Q6H PRN    ibuprofen (MOTRIN) tablet 600 mg  600 mg Oral TID    pantoprazole (PROTONIX) tablet 40 mg  40 mg Oral ACB    senna (SENOKOT) tablet 17.2 mg  2 Tab Oral QHS    docusate sodium (COLACE) capsule 100 mg  100 mg Oral BID    sodium chloride (NS) flush 5-10 mL  5-10 mL IntraVENous PRN    naloxone (NARCAN) injection 0.4 mg  0.4 mg IntraVENous PRN    ondansetron (ZOFRAN) injection 4 mg  4 mg IntraVENous Q4H PRN    heparin (porcine) injection 5,000 Units  5,000 Units SubCUTAneous Q12H    sodium chloride (NS) flush 5-10 mL  5-10 mL IntraVENous Q8H    sodium chloride (NS) flush 5-10 mL  5-10 mL IntraVENous PRN    sodium chloride (NS) flush 5-10 mL  5-10 mL IntraVENous PRN    influenza vaccine 2016-17 (36mos+)(PF) (FLUZONE/FLUARIX/FLULAVAL QUAD) injection 0.5 mL  0.5 mL IntraMUSCular PRIOR TO DISCHARGE       Review of Systems  Constitutional: negative for fever, chills, sweats  Cardiovascular: negative for chest pain, palpitations, syncope, edema  Gastrointestinal:  negative for dysphagia, reflux, vomiting, diarrhea, abdominal pain, or melena  Neurologic:  negative for focal weakness, numbness, headache    Objective:     Vitals:    04/07/17 1858 04/08/17 0006 04/08/17 0710 04/08/17 1045   BP: 98/56 104/61 103/64 91/41   Pulse: 76 69 73 75   Resp: 18 18 20 18   Temp:  98.2 °F (36.8 °C) 98.5 °F (36.9 °C) 97.9 °F (36.6 °C)   SpO2: 96% 98% 97% 95%     Intake and Output:   04/06 1901 - 04/08 0700  In: 1260 [P.O.:1260]  Out: 1240 [Urine:1240]  04/08 0701 - 04/08 1900  In: 120 [P.O.:120]  Out: -     Physical Exam:   Constitution:  the patient is well developed and in no acute distress, room air  EENMT:  Sclera clear, pupils equal, oral mucosa moist  Respiratory: few right base crackles with diminished breath sounds, right chest tube to water seal with no air leak  Cardiovascular:  RRR without M,G,R  Gastrointestinal: soft and non-tender; with positive bowel sounds. Musculoskeletal: warm without cyanosis. There is no lower leg edema. Skin:  no jaundice or rashes, surgical incision  Neurologic: no gross neuro deficits     Psychiatric:  alert and oriented x 3    CXR 4/8/17:                       LAB  No results for input(s): GLUCPOC in the last 72 hours. No lab exists for component: GLPOC   No results for input(s): WBC, HGB, HCT, PLT, INR, HGBEXT, HCTEXT, PLTEXT, HGBEXT, HCTEXT, PLTEXT in the last 72 hours. No lab exists for component: INREXT, INREXT  Recent Labs      04/06/17   1121   NA  142   K  4.1   CL  104   CO2  30   GLU  104*   BUN  14   CREA  0.92   CA  8.6     No results for input(s): PH, PCO2, PO2, HCO3 in the last 72 hours. No results for input(s): LCAD, LAC in the last 72 hours.       Assessment:  (Medical Decision Making)     Hospital Problems  Date Reviewed: 4/7/2017          Codes Class Noted POA    Chest wall pain following surgery ICD-10-CM: G89.12  ICD-9-CM: 338.12  4/6/2017 No    Fair control with Motrin and prn opioid     S/P thoracotomy ICD-10-CM: Z98.890  ICD-9-CM: V45.89  4/5/2017 No     4/4/17  Right VATS procedure with right pleurodesis/Apical Bleb resection/Right Chest tube thoracostomy         Per surgery    * (Principal)Pneumothorax, right ICD-10-CM: J93.9  ICD-9-CM: 512.89  3/21/2017 Yes    Small reported on CXR          Plan:  (Medical Decision Making)     --Surgery following--     --On scheduled Motrin  --per surgery, CXR this pm    More than 50% of the time documented was spent in face-to-face contact with the patient and in the care of the patient on the floor/unit where the patient is located.     Cheryl Verma MD

## 2017-10-05 PROBLEM — H93.8X1 SENSATION OF FULLNESS IN RIGHT EAR: Status: ACTIVE | Noted: 2017-10-05

## 2017-10-05 PROBLEM — G44.219 EPISODIC TENSION-TYPE HEADACHE: Status: ACTIVE | Noted: 2017-10-05

## 2017-10-17 ENCOUNTER — HOSPITAL ENCOUNTER (OUTPATIENT)
Dept: CT IMAGING | Age: 42
Discharge: HOME | End: 2017-10-17
Attending: INTERNAL MEDICINE
Payer: COMMERCIAL

## 2017-10-17 DIAGNOSIS — G44.211 INTRACTABLE EPISODIC TENSION-TYPE HEADACHE: ICD-10-CM

## 2017-10-17 PROCEDURE — 70450 CT HEAD/BRAIN W/O DYE: CPT

## 2017-10-19 NOTE — PROGRESS NOTES
Ask Neurosurgery to look at CT head; see what they recommend = MRI, Neurology, or Neurosurgery eval.

## 2017-10-23 NOTE — PROGRESS NOTES
Dr Jessica Fields spoke with Dr Olya Pereira over the phone and got recommendation for MRI with and without contrast. Referral will be put in to refer to her.  Dr Jessica Fields discussed with patient

## 2017-10-27 ENCOUNTER — HOSPITAL ENCOUNTER (OUTPATIENT)
Dept: MRI IMAGING | Age: 42
Discharge: HOME | End: 2017-10-27
Attending: INTERNAL MEDICINE
Payer: COMMERCIAL

## 2017-10-27 DIAGNOSIS — G93.89 BRAIN MASS: ICD-10-CM

## 2017-10-27 PROCEDURE — 74011250636 HC RX REV CODE- 250/636: Performed by: INTERNAL MEDICINE

## 2017-10-27 PROCEDURE — A9577 INJ MULTIHANCE: HCPCS | Performed by: INTERNAL MEDICINE

## 2017-10-27 PROCEDURE — 70553 MRI BRAIN STEM W/O & W/DYE: CPT

## 2017-10-27 RX ORDER — SODIUM CHLORIDE 0.9 % (FLUSH) 0.9 %
10 SYRINGE (ML) INJECTION
Status: COMPLETED | OUTPATIENT
Start: 2017-10-27 | End: 2017-10-27

## 2017-10-27 RX ADMIN — Medication 10 ML: at 18:16

## 2017-10-27 RX ADMIN — GADOBENATE DIMEGLUMINE 11 ML: 529 INJECTION, SOLUTION INTRAVENOUS at 18:16

## 2018-02-12 PROBLEM — D18.00 CAVERNOUS ANGIOMA: Status: ACTIVE | Noted: 2018-02-12

## 2018-04-10 ENCOUNTER — HOSPITAL ENCOUNTER (OUTPATIENT)
Dept: MRI IMAGING | Age: 43
Discharge: HOME | End: 2018-04-10
Attending: NEUROLOGICAL SURGERY
Payer: COMMERCIAL

## 2018-04-10 DIAGNOSIS — D18.00 CAVERNOUS ANGIOMA: ICD-10-CM

## 2018-04-10 PROCEDURE — 70553 MRI BRAIN STEM W/O & W/DYE: CPT

## 2018-04-10 PROCEDURE — 74011250636 HC RX REV CODE- 250/636: Performed by: NEUROLOGICAL SURGERY

## 2018-04-10 PROCEDURE — A9577 INJ MULTIHANCE: HCPCS | Performed by: NEUROLOGICAL SURGERY

## 2018-04-10 RX ORDER — SODIUM CHLORIDE 0.9 % (FLUSH) 0.9 %
10 SYRINGE (ML) INJECTION
Status: COMPLETED | OUTPATIENT
Start: 2018-04-10 | End: 2018-04-10

## 2018-04-10 RX ADMIN — GADOBENATE DIMEGLUMINE 12 ML: 529 INJECTION, SOLUTION INTRAVENOUS at 16:46

## 2018-04-10 RX ADMIN — Medication 10 ML: at 16:46

## 2018-06-28 NOTE — DISCHARGE INSTRUCTIONS
Chest Tube: What to Expect at Home  Your Recovery  A chest tube is placed through the chest wall between two ribs. You had a chest tube put in to help your collapsed lung expand. The tube was removed before you came home. You may have some pain in your chest from the cut (incision) where the tube was put in. For most people, the pain goes away after about 2 weeks. You will have a bandage taped over the wound. Your doctor will remove the bandage and examine the wound in about 2 days. It will take about 3 to 4 weeks for your incision to heal completely. It may leave a small scar that will fade with time. This care sheet gives you a general idea about how long it will take for you to recover. But each person recovers at a different pace. Follow the steps below to feel better as quickly as possible. How can you care for yourself at home? Activity  · Rest when you feel tired. Getting enough sleep will help you recover. · Try to walk each day. Start by walking a little more than you did the day before. Bit by bit, increase the amount you walk. Walking boosts blood flow and helps prevent pneumonia and constipation. · Avoid strenuous activities, such as bicycle riding, jogging, weight lifting, or aerobic exercise, until your doctor says it is okay. · How soon you can return to work or your normal routine depends on what health problems you have. Talk with your doctor about how long it will take you to recover. · You may shower after your bandage is removed. Pat the cut (incision) dry. Do not take a bath for 2 weeks after your chest tube is out, or until your doctor tells you it is okay. · Practice deep breathing exercises as directed by your doctor. Coughing exercises also can help drain fluid out of your chest.  Diet  · You can eat your normal diet. If your stomach is upset, try bland, low-fat foods like plain rice, broiled chicken, toast, and yogurt.   · Drink plenty of fluids (unless your doctor tells you not to). Medicines  · Your doctor will tell you if and when you can restart your medicines. He or she will also give you instructions about taking any new medicines. · If you take blood thinners, such as warfarin (Coumadin), clopidogrel (Plavix), or aspirin, be sure to talk to your doctor. He or she will tell you if and when to start taking those medicines again. Make sure that you understand exactly what your doctor wants you to do. · Be safe with medicines. Take pain medicines exactly as directed. ¨ If the doctor gave you a prescription medicine for pain, take it as prescribed. ¨ If you are not taking a prescription pain medicine, ask your doctor if you can take an over-the-counter medicine. · Take your antibiotics as directed. Do not stop taking them just because you feel better. You need to take the full course of antibiotics. Incision care  · Keep the incision dry as it heals. You will have a bandage over it to help the incision heal and protect it. Your doctor will tell you how to take care of this. Other instructions  · Do not smoke. Smoking makes lung problems worse. If you need help quitting, talk to your doctor about stop-smoking programs and medicines. These can increase your chances of quitting for good. Follow-up care is a key part of your treatment and safety. Be sure to make and go to all appointments, and call your doctor if you are having problems. It's also a good idea to know your test results and keep a list of the medicines you take. When should you call for help? Call 911 anytime you think you may need emergency care. For example, call if:  · You passed out (lost consciousness). · You have severe trouble breathing. · You have sudden chest pain and shortness of breath, or you cough up blood. Call your doctor now or seek immediate medical care if:  · You continue to have trouble breathing. · Your shortness of breath is getting worse.   · Bright red blood soaks through the bandage over your incision. · You have a fever. Watch closely for any changes in your health, and be sure to contact your doctor if:  · You do not get better as expected. Where can you learn more? Go to http://mirna-melissa.info/. Enter Z410 in the search box to learn more about \"Chest Tube: What to Expect at Home. \"  Current as of: May 23, 2016  Content Version: 11.1  © 2006-2016 [employer]. Care instructions adapted under license by [employer] (which disclaims liability or warranty for this information). If you have questions about a medical condition or this instruction, always ask your healthcare professional. [place] [address] any warranty or liability for your use of this information. DISCHARGE SUMMARY from Nurse    The following personal items are in your possession at time of discharge:    Dental Appliances: None  Visual Aid: None     Home Medications: None  Jewelry: With patient  Clothing: With patient, Undergarments, Socks, Shirt, Pants, Footwear, Belt  Other Valuables: At bedside, Cell Phone             PATIENT INSTRUCTIONS:    After general anesthesia or intravenous sedation, for 24 hours or while taking prescription Narcotics:  · Limit your activities  · Do not drive and operate hazardous machinery  · Do not make important personal or business decisions  · Do  not drink alcoholic beverages  · If you have not urinated within 8 hours after discharge, please contact your surgeon on call.     Report the following to your surgeon:  · Excessive pain, swelling, redness or odor of or around the surgical area  · Temperature over 100.5  · Nausea and vomiting lasting longer than 4 hours or if unable to take medications  · Any signs of decreased circulation or nerve impairment to extremity: change in color, persistent  numbness, tingling, coldness or increase pain  · Any questions        What to do at Home:  Recommended activity: Activity as tolerated, per MD instructions    If you experience any of the following symptoms fever > 100.5, nausea, vomiting, pain, chest pain, shortness of breath please follow up with MD.      *  Please give a list of your current medications to your Primary Care Provider. *  Please update this list whenever your medications are discontinued, doses are      changed, or new medications (including over-the-counter products) are added. *  Please carry medication information at all times in case of emergency situations. These are general instructions for a healthy lifestyle:    No smoking/ No tobacco products/ Avoid exposure to second hand smoke    Surgeon General's Warning:  Quitting smoking now greatly reduces serious risk to your health. Obesity, smoking, and sedentary lifestyle greatly increases your risk for illness    A healthy diet, regular physical exercise & weight monitoring are important for maintaining a healthy lifestyle    You may be retaining fluid if you have a history of heart failure or if you experience any of the following symptoms:  Weight gain of 3 pounds or more overnight or 5 pounds in a week, increased swelling in our hands or feet or shortness of breath while lying flat in bed. Please call your doctor as soon as you notice any of these symptoms; do not wait until your next office visit. Recognize signs and symptoms of STROKE:    F-face looks uneven    A-arms unable to move or move unevenly    S-speech slurred or non-existent    T-time-call 911 as soon as signs and symptoms begin-DO NOT go       Back to bed or wait to see if you get better-TIME IS BRAIN. Warning Signs of HEART ATTACK     Call 911 if you have these symptoms:   Chest discomfort. Most heart attacks involve discomfort in the center of the chest that lasts more than a few minutes, or that goes away and comes back. It can feel like uncomfortable pressure, squeezing, fullness, or pain.  Discomfort in other areas of the upper body. Symptoms can include pain or discomfort in one or both arms, the back, neck, jaw, or stomach.  Shortness of breath with or without chest discomfort.  Other signs may include breaking out in a cold sweat, nausea, or lightheadedness. Don't wait more than five minutes to call 911 - MINUTES MATTER! Fast action can save your life. Calling 911 is almost always the fastest way to get lifesaving treatment. Emergency Medical Services staff can begin treatment when they arrive -- up to an hour sooner than if someone gets to the hospital by car. The discharge information has been reviewed with the patient. The patient verbalized understanding. Discharge medications reviewed with the patient and appropriate educational materials and side effects teaching were provided. Collapsed Lung: Care Instructions  Your Care Instructions    A collapsed lung (pneumothorax) is a buildup of air in the space between the lung and the chest wall. As more air builds up in this space, the pressure against the lung makes the lung collapse. This causes shortness of breath and chest pain because your lung cannot fully expand. A collapsed lung is usually caused by an injury to the chest, but it may also occur suddenly without an injury because of a lung illness, such as emphysema or lung fibrosis. Your lung may collapse after lung surgery or another medical procedure. Sometimes it happens for no known reason in an otherwise healthy person (spontaneous pneumothorax). Treatment depends on the cause of the collapse. It may heal with rest, although your doctor will want to keep track of your progress. It can take several days for the lung to expand again. Your doctor may have drained the air with a needle or tube inserted into the space between your chest and the collapsed lung. If you have a chest tube, be sure to follow your doctor's instructions about how to care for the tube.   You may need further treatment if you are not getting better. Surgery is sometimes needed to keep the lung inflated. The doctor will want to keep track of your progress, so you will need a follow-up exam within a few days. The doctor has checked you carefully, but problems can develop later. If you notice any problems or new symptoms, get medical treatment right away. Follow-up care is a key part of your treatment and safety. Be sure to make and go to all appointments, and call your doctor if you are having problems. It's also a good idea to know your test results and keep a list of the medicines you take. How can you care for yourself at home? · Get plenty of rest and sleep. You may feel weak and tired for a while, but your energy level will improve with time. · Hold a pillow against your chest when you cough or take deep breaths. This will support your chest and decrease your pain. · Take pain medicines exactly as directed. ¨ If the doctor gave you a prescription medicine for pain, take it as prescribed. ¨ If you are not taking a prescription pain medicine, ask your doctor if you can take an over-the-counter medicine. · If your doctor prescribed antibiotics, take them as directed. Do not stop taking them just because you feel better. You need to take the full course of antibiotics. · If you have a bandage over your chest tube, or the place where the chest tube was inserted, keep it clean and dry. Follow your doctor's instructions on bandage care. · If you go home with a tube in place, follow the doctor's directions. Do not adjust the tube in any way. This could break the seal or cause other problems. Keep the tube dry. · Avoid any movements that require your muscles, especially your chest muscles, to strain. Such movements include laughing hard, bearing down to have a bowel movement, and heavy lifting. Try not to cough. · Do not fly in an airplane until your doctor tells you it is okay.  Avoid any situations where there is increased air pressure. · Do not smoke or allow others to smoke around you. If you need help quitting, talk to your doctor about stop-smoking programs and medicines. These can increase your chances of quitting for good. When should you call for help? Call 911 anytime you think you may need emergency care. For example, call if:  · You have severe trouble breathing. · You passed out (lost consciousness). Call your doctor now or seek immediate medical care if:  · You have new or worse trouble breathing. · You have new pain or your pain gets worse. · You have a fever. · You cough up blood. · Your chest tube starts to come out or falls out. · You are bleeding through the bandage where the tube was put in. Watch closely for changes in your health, and be sure to contact your doctor if:  · The skin around the place where the chest tube was put in is red or irritated. · You do not get better as expected. Where can you learn more? Go to http://mirna-melissa.info/. Enter H422 in the search box to learn more about \"Collapsed Lung: Care Instructions. \"  Current as of: May 23, 2016  Content Version: 11.1  © 0983-1727 [employer]. Care instructions adapted under license by [facility] (which disclaims liability or warranty for this information). If you have questions about a medical condition or this instruction, always ask your healthcare professional. [address] any warranty or liability for your use of this information. assist LE/1 person assist

## 2019-06-14 ENCOUNTER — HOSPITAL ENCOUNTER (OUTPATIENT)
Dept: MRI IMAGING | Age: 44
Discharge: HOME | End: 2019-06-14
Attending: NEUROLOGICAL SURGERY
Payer: COMMERCIAL

## 2019-06-14 DIAGNOSIS — D18.00 CAVERNOUS ANGIOMA: ICD-10-CM

## 2019-06-14 PROCEDURE — 70553 MRI BRAIN STEM W/O & W/DYE: CPT

## 2019-06-14 PROCEDURE — A9575 INJ GADOTERATE MEGLUMI 0.1ML: HCPCS | Performed by: NEUROLOGICAL SURGERY

## 2019-06-14 PROCEDURE — 74011250636 HC RX REV CODE- 250/636: Performed by: NEUROLOGICAL SURGERY

## 2019-06-14 RX ORDER — SODIUM CHLORIDE 0.9 % (FLUSH) 0.9 %
10 SYRINGE (ML) INJECTION
Status: COMPLETED | OUTPATIENT
Start: 2019-06-14 | End: 2019-06-14

## 2019-06-14 RX ORDER — GADOTERATE MEGLUMINE 376.9 MG/ML
13 INJECTION INTRAVENOUS
Status: COMPLETED | OUTPATIENT
Start: 2019-06-14 | End: 2019-06-14

## 2019-06-14 RX ADMIN — Medication 10 ML: at 19:28

## 2019-06-14 RX ADMIN — GADOTERATE MEGLUMINE 13 ML: 376.9 INJECTION INTRAVENOUS at 19:28

## 2023-10-24 ENCOUNTER — APPOINTMENT (RX ONLY)
Dept: URBAN - METROPOLITAN AREA CLINIC 24 | Facility: CLINIC | Age: 48
Setting detail: DERMATOLOGY
End: 2023-10-24

## 2023-10-24 DIAGNOSIS — L81.4 OTHER MELANIN HYPERPIGMENTATION: ICD-10-CM

## 2023-10-24 DIAGNOSIS — L57.8 OTHER SKIN CHANGES DUE TO CHRONIC EXPOSURE TO NONIONIZING RADIATION: ICD-10-CM

## 2023-10-24 DIAGNOSIS — Z71.89 OTHER SPECIFIED COUNSELING: ICD-10-CM

## 2023-10-24 DIAGNOSIS — D22 MELANOCYTIC NEVI: ICD-10-CM

## 2023-10-24 DIAGNOSIS — D18.0 HEMANGIOMA: ICD-10-CM

## 2023-10-24 DIAGNOSIS — L82.1 OTHER SEBORRHEIC KERATOSIS: ICD-10-CM

## 2023-10-24 DIAGNOSIS — L57.0 ACTINIC KERATOSIS: ICD-10-CM

## 2023-10-24 PROBLEM — D18.01 HEMANGIOMA OF SKIN AND SUBCUTANEOUS TISSUE: Status: ACTIVE | Noted: 2023-10-24

## 2023-10-24 PROBLEM — D22.5 MELANOCYTIC NEVI OF TRUNK: Status: ACTIVE | Noted: 2023-10-24

## 2023-10-24 PROCEDURE — ? LIQUID NITROGEN

## 2023-10-24 PROCEDURE — 99203 OFFICE O/P NEW LOW 30 MIN: CPT | Mod: 25

## 2023-10-24 PROCEDURE — 17000 DESTRUCT PREMALG LESION: CPT

## 2023-10-24 PROCEDURE — ? COUNSELING

## 2023-10-24 ASSESSMENT — LOCATION DETAILED DESCRIPTION DERM
LOCATION DETAILED: STERNAL NOTCH
LOCATION DETAILED: RIGHT CENTRAL FRONTAL SCALP
LOCATION DETAILED: LEFT DISTAL RADIAL DORSAL FOREARM
LOCATION DETAILED: LEFT INFERIOR UPPER BACK
LOCATION DETAILED: INFERIOR THORACIC SPINE
LOCATION DETAILED: RIGHT SUPERIOR LATERAL NECK
LOCATION DETAILED: RIGHT DORSAL FOOT
LOCATION DETAILED: EPIGASTRIC SKIN
LOCATION DETAILED: SUPERIOR THORACIC SPINE
LOCATION DETAILED: RIGHT CENTRAL MALAR CHEEK
LOCATION DETAILED: LEFT CENTRAL MALAR CHEEK
LOCATION DETAILED: LEFT NASAL DORSUM
LOCATION DETAILED: RIGHT SUPERIOR CENTRAL MALAR CHEEK
LOCATION DETAILED: RIGHT DISTAL DORSAL FOREARM
LOCATION DETAILED: MID TRAPEZIAL NECK
LOCATION DETAILED: LEFT POSTERIOR SHOULDER

## 2023-10-24 ASSESSMENT — LOCATION ZONE DERM
LOCATION ZONE: FACE
LOCATION ZONE: SCALP
LOCATION ZONE: TRUNK
LOCATION ZONE: NOSE
LOCATION ZONE: NECK
LOCATION ZONE: FEET
LOCATION ZONE: ARM

## 2023-10-24 ASSESSMENT — LOCATION SIMPLE DESCRIPTION DERM
LOCATION SIMPLE: RIGHT SCALP
LOCATION SIMPLE: NECK
LOCATION SIMPLE: UPPER BACK
LOCATION SIMPLE: TRAPEZIAL NECK
LOCATION SIMPLE: LEFT CHEEK
LOCATION SIMPLE: LEFT UPPER BACK
LOCATION SIMPLE: RIGHT CHEEK
LOCATION SIMPLE: ABDOMEN
LOCATION SIMPLE: LEFT SHOULDER
LOCATION SIMPLE: LEFT FOREARM
LOCATION SIMPLE: NOSE
LOCATION SIMPLE: CHEST
LOCATION SIMPLE: RIGHT FOREARM
LOCATION SIMPLE: RIGHT FOOT

## 2024-10-18 NOTE — PROGRESS NOTES
Pt alert and oriented. Able to make needs known. Assessment completed and documented. NAD noted. Call light within easy reach. Will continue to monitor. Patient/Caregiver provided printed discharge information.

## 2025-02-25 ENCOUNTER — APPOINTMENT (OUTPATIENT)
Dept: URBAN - METROPOLITAN AREA CLINIC 24 | Facility: CLINIC | Age: 50
Setting detail: DERMATOLOGY
End: 2025-02-25

## 2025-02-25 DIAGNOSIS — L81.4 OTHER MELANIN HYPERPIGMENTATION: ICD-10-CM

## 2025-02-25 DIAGNOSIS — L82.1 OTHER SEBORRHEIC KERATOSIS: ICD-10-CM

## 2025-02-25 DIAGNOSIS — L57.8 OTHER SKIN CHANGES DUE TO CHRONIC EXPOSURE TO NONIONIZING RADIATION: ICD-10-CM

## 2025-02-25 DIAGNOSIS — L57.3 POIKILODERMA OF CIVATTE: ICD-10-CM

## 2025-02-25 DIAGNOSIS — L57.0 ACTINIC KERATOSIS: ICD-10-CM

## 2025-02-25 DIAGNOSIS — Z71.89 OTHER SPECIFIED COUNSELING: ICD-10-CM

## 2025-02-25 DIAGNOSIS — D18.0 HEMANGIOMA: ICD-10-CM

## 2025-02-25 DIAGNOSIS — D22 MELANOCYTIC NEVI: ICD-10-CM

## 2025-02-25 PROBLEM — D22.5 MELANOCYTIC NEVI OF TRUNK: Status: ACTIVE | Noted: 2025-02-25

## 2025-02-25 PROBLEM — D18.01 HEMANGIOMA OF SKIN AND SUBCUTANEOUS TISSUE: Status: ACTIVE | Noted: 2025-02-25

## 2025-02-25 PROCEDURE — ? PRESCRIPTION MEDICATION MANAGEMENT

## 2025-02-25 PROCEDURE — ? PRESCRIPTION

## 2025-02-25 PROCEDURE — 99213 OFFICE O/P EST LOW 20 MIN: CPT

## 2025-02-25 PROCEDURE — ? COUNSELING

## 2025-02-25 RX ORDER — FLUOROURACIL 5 MG/G
CREAM TOPICAL
Qty: 40 | Refills: 1 | Status: ERX | COMMUNITY
Start: 2025-02-25

## 2025-02-25 RX ADMIN — FLUOROURACIL: 5 CREAM TOPICAL at 00:00

## 2025-02-25 ASSESSMENT — LOCATION ZONE DERM
LOCATION ZONE: TRUNK
LOCATION ZONE: ARM
LOCATION ZONE: SCALP
LOCATION ZONE: NOSE
LOCATION ZONE: NECK
LOCATION ZONE: FEET
LOCATION ZONE: FACE

## 2025-02-25 ASSESSMENT — LOCATION SIMPLE DESCRIPTION DERM
LOCATION SIMPLE: SCALP
LOCATION SIMPLE: LEFT SHOULDER
LOCATION SIMPLE: RIGHT CHEEK
LOCATION SIMPLE: TRAPEZIAL NECK
LOCATION SIMPLE: UPPER BACK
LOCATION SIMPLE: LEFT CHEEK
LOCATION SIMPLE: LEFT FOREHEAD
LOCATION SIMPLE: RIGHT SCALP
LOCATION SIMPLE: RIGHT FOOT
LOCATION SIMPLE: ABDOMEN
LOCATION SIMPLE: CHEST
LOCATION SIMPLE: RIGHT FOREARM
LOCATION SIMPLE: LEFT UPPER BACK
LOCATION SIMPLE: LEFT FOREARM
LOCATION SIMPLE: NOSE
LOCATION SIMPLE: NECK

## 2025-02-25 ASSESSMENT — LOCATION DETAILED DESCRIPTION DERM
LOCATION DETAILED: LEFT CENTRAL PARIETAL SCALP
LOCATION DETAILED: LEFT NASAL DORSUM
LOCATION DETAILED: EPIGASTRIC SKIN
LOCATION DETAILED: LEFT INFERIOR UPPER BACK
LOCATION DETAILED: MID TRAPEZIAL NECK
LOCATION DETAILED: RIGHT CENTRAL FRONTAL SCALP
LOCATION DETAILED: LEFT POSTERIOR SHOULDER
LOCATION DETAILED: RIGHT CENTRAL MALAR CHEEK
LOCATION DETAILED: SUPERIOR THORACIC SPINE
LOCATION DETAILED: INFERIOR THORACIC SPINE
LOCATION DETAILED: LEFT SUPERIOR CENTRAL MALAR CHEEK
LOCATION DETAILED: LEFT CENTRAL MALAR CHEEK
LOCATION DETAILED: RIGHT SUPERIOR CENTRAL MALAR CHEEK
LOCATION DETAILED: RIGHT SUPERIOR LATERAL NECK
LOCATION DETAILED: LEFT DISTAL RADIAL DORSAL FOREARM
LOCATION DETAILED: STERNAL NOTCH
LOCATION DETAILED: LEFT SUPERIOR FOREHEAD
LOCATION DETAILED: RIGHT DORSAL FOOT
LOCATION DETAILED: RIGHT DISTAL DORSAL FOREARM

## 2025-02-25 NOTE — PROCEDURE: PRESCRIPTION MEDICATION MANAGEMENT
Plan: Handout with detailed instructions given to patient\\nFailed tx with LN2.
Render In Strict Bullet Format?: No
Initiate Treatment: Fluorouracil 5 % topical cream (Apply a thin layer twice daily to AA on left cheek for 2 weeks. Redness and irritation will occur. Apply Vaseline liberally.)
Detail Level: Zone

## (undated) DEVICE — AMD ANTIMICROBIAL GAUZE SPONGES,12 PLY USP TYPE VII, 0.2% POLYHEXAMETHYLENE BIGUANIDE HCI (PHMB): Brand: CURITY

## (undated) DEVICE — NEEDLE HYPO 21GA L1.5IN INTRAMUSCULAR S STL LATCH BVL UP

## (undated) DEVICE — TRAY PROC 13FR L10CM RED SIGNAL DIAPH FLX CATH SYR ACT DRN

## (undated) DEVICE — SUTURE VCRL SZ 3-0 L27IN ABSRB UD L26MM SH 1/2 CIR J416H

## (undated) DEVICE — SOL ANTI-FOG 6ML MEDC -- MEDICHOICE - CONVERT TO 358427

## (undated) DEVICE — DRSG GZ PETROLATM OCL 3X18IN -- CURAD

## (undated) DEVICE — [HIGH FLOW INSUFFLATOR,  DO NOT USE IF PACKAGE IS DAMAGED,  KEEP DRY,  KEEP AWAY FROM SUNLIGHT,  PROTECT FROM HEAT AND RADIOACTIVE SOURCES.]: Brand: PNEUMOSURE

## (undated) DEVICE — GEL MEDC ULTRASOUND 5L -- REPLACED BY 326862

## (undated) DEVICE — PAD,NON-ADHERENT,3X8,STERILE,LF,1/PK: Brand: MEDLINE

## (undated) DEVICE — STERILE POLYISOPRENE POWDER-FREE SURGICAL GLOVES: Brand: PROTEXIS

## (undated) DEVICE — SPONGE LAP 18X18IN STRL -- 5/PK

## (undated) DEVICE — INTENDED FOR TISSUE SEPARATION, AND OTHER PROCEDURES THAT REQUIRE A SHARP SURGICAL BLADE TO PUNCTURE OR CUT.: Brand: BARD-PARKER SAFETY BLADES SIZE 11, STERILE

## (undated) DEVICE — UNIVERSAL FIXATION CANNULA: Brand: VERSAONE

## (undated) DEVICE — LAP CHOLE: Brand: MEDLINE INDUSTRIES, INC.

## (undated) DEVICE — BLADELESS OPTICAL TROCAR WITH FIXATION CANNULA: Brand: VERSAONE

## (undated) DEVICE — INTENDED FOR TISSUE SEPARATION, AND OTHER PROCEDURES THAT REQUIRE A SHARP SURGICAL BLADE TO PUNCTURE OR CUT.: Brand: BARD-PARKER SAFETY BLADES SIZE 15, STERILE

## (undated) DEVICE — AMD ANTIMICROBIAL GAUZE SPONGES 8 PLY USP TYPE VII: Brand: CURITY

## (undated) DEVICE — AGENT SCLEROSING 4GM INTRAPLEURAL AERO SGL DOSE FOR MALIG

## (undated) DEVICE — CATHETER THORACENTESIS STR 28 FRX23 IN 6 EYELET TAPR TIP LF

## (undated) DEVICE — 3M™ TEGADERM™ TRANSPARENT FILM DRESSING FRAME STYLE, 1624W, 2-3/8 IN X 2-3/4 IN (6 CM X 7 CM), 100/CT 4CT/CASE: Brand: 3M™ TEGADERM™

## (undated) DEVICE — SLIM BODY SKIN STAPLER: Brand: APPOSE ULC

## (undated) DEVICE — BLADELESS OPTICAL TROCAR WITH FIXATION CANNULA: Brand: VERSAPORT

## (undated) DEVICE — ARTICULATION RELOAD WITH TRI-STAPLE TECHNOLOGY: Brand: ENDO GIA

## (undated) DEVICE — Device

## (undated) DEVICE — UNIVERSAL STAPLER: Brand: ENDO GIA ULTRA

## (undated) DEVICE — REM POLYHESIVE ADULT PATIENT RETURN ELECTRODE: Brand: VALLEYLAB

## (undated) DEVICE — 2, DISPOSABLE SUCTION/IRRIGATOR WITHOUT DISPOSABLE TIP: Brand: STRYKEFLOW